# Patient Record
Sex: MALE | Race: BLACK OR AFRICAN AMERICAN | NOT HISPANIC OR LATINO | Employment: OTHER | URBAN - METROPOLITAN AREA
[De-identification: names, ages, dates, MRNs, and addresses within clinical notes are randomized per-mention and may not be internally consistent; named-entity substitution may affect disease eponyms.]

---

## 2023-05-13 ENCOUNTER — HOSPITAL ENCOUNTER (EMERGENCY)
Facility: HOSPITAL | Age: 68
Discharge: HOME | End: 2023-05-13
Attending: STUDENT IN AN ORGANIZED HEALTH CARE EDUCATION/TRAINING PROGRAM
Payer: MEDICARE

## 2023-05-13 VITALS
HEART RATE: 64 BPM | RESPIRATION RATE: 18 BRPM | SYSTOLIC BLOOD PRESSURE: 165 MMHG | HEIGHT: 68 IN | BODY MASS INDEX: 36.09 KG/M2 | DIASTOLIC BLOOD PRESSURE: 83 MMHG | WEIGHT: 238.13 LBS | OXYGEN SATURATION: 99 % | TEMPERATURE: 98 F

## 2023-05-13 DIAGNOSIS — D64.9 ANEMIA, UNSPECIFIED TYPE: Primary | ICD-10-CM

## 2023-05-13 DIAGNOSIS — R53.83 FATIGUE: ICD-10-CM

## 2023-05-13 DIAGNOSIS — G47.33 OSA (OBSTRUCTIVE SLEEP APNEA): ICD-10-CM

## 2023-05-13 LAB
ALBUMIN SERPL-MCNC: 3.6 G/DL (ref 3.4–4.8)
ALBUMIN/GLOB SERPL: 1.1 RATIO (ref 1.1–2)
ALP SERPL-CCNC: 76 UNIT/L (ref 40–150)
ALT SERPL-CCNC: 25 UNIT/L (ref 0–55)
AST SERPL-CCNC: 22 UNIT/L (ref 5–34)
BASOPHILS # BLD AUTO: 0.02 X10(3)/MCL
BASOPHILS NFR BLD AUTO: 0.3 %
BILIRUBIN DIRECT+TOT PNL SERPL-MCNC: 0.2 MG/DL
BUN SERPL-MCNC: 10.6 MG/DL (ref 8.4–25.7)
CALCIUM SERPL-MCNC: 9.2 MG/DL (ref 8.8–10)
CHLORIDE SERPL-SCNC: 110 MMOL/L (ref 98–107)
CO2 SERPL-SCNC: 26 MMOL/L (ref 23–31)
CREAT SERPL-MCNC: 0.99 MG/DL (ref 0.73–1.18)
EOSINOPHIL # BLD AUTO: 0.36 X10(3)/MCL (ref 0–0.9)
EOSINOPHIL NFR BLD AUTO: 5.1 %
ERYTHROCYTE [DISTWIDTH] IN BLOOD BY AUTOMATED COUNT: 12.8 % (ref 11.5–17)
FLUAV AG UPPER RESP QL IA.RAPID: NOT DETECTED
FLUBV AG UPPER RESP QL IA.RAPID: NOT DETECTED
GFR SERPLBLD CREATININE-BSD FMLA CKD-EPI: >60 MLS/MIN/1.73/M2
GLOBULIN SER-MCNC: 3.2 GM/DL (ref 2.4–3.5)
GLUCOSE SERPL-MCNC: 98 MG/DL (ref 82–115)
GROUP & RH: NORMAL
HCT VFR BLD AUTO: 36.4 % (ref 42–52)
HGB BLD-MCNC: 11.7 G/DL (ref 14–18)
IMM GRANULOCYTES # BLD AUTO: 0.02 X10(3)/MCL (ref 0–0.04)
IMM GRANULOCYTES NFR BLD AUTO: 0.3 %
INDIRECT COOMBS GEL: NORMAL
LYMPHOCYTES # BLD AUTO: 1.58 X10(3)/MCL (ref 0.6–4.6)
LYMPHOCYTES NFR BLD AUTO: 22.3 %
MAGNESIUM SERPL-MCNC: 1.9 MG/DL (ref 1.6–2.6)
MCH RBC QN AUTO: 29 PG (ref 27–31)
MCHC RBC AUTO-ENTMCNC: 32.1 G/DL (ref 33–36)
MCV RBC AUTO: 90.1 FL (ref 80–94)
MONOCYTES # BLD AUTO: 0.77 X10(3)/MCL (ref 0.1–1.3)
MONOCYTES NFR BLD AUTO: 10.8 %
NEUTROPHILS # BLD AUTO: 4.35 X10(3)/MCL (ref 2.1–9.2)
NEUTROPHILS NFR BLD AUTO: 61.2 %
NRBC BLD AUTO-RTO: 0 %
PHOSPHATE SERPL-MCNC: 2.9 MG/DL (ref 2.3–4.7)
PLATELET # BLD AUTO: 256 X10(3)/MCL (ref 130–400)
PMV BLD AUTO: 9.9 FL (ref 7.4–10.4)
POTASSIUM SERPL-SCNC: 4.1 MMOL/L (ref 3.5–5.1)
PROT SERPL-MCNC: 6.8 GM/DL (ref 5.8–7.6)
RBC # BLD AUTO: 4.04 X10(6)/MCL (ref 4.7–6.1)
RSV A 5' UTR RNA NPH QL NAA+PROBE: NOT DETECTED
SARS-COV-2 RNA RESP QL NAA+PROBE: NOT DETECTED
SODIUM SERPL-SCNC: 143 MMOL/L (ref 136–145)
SPECIMEN OUTDATE: NORMAL
TROPONIN I SERPL-MCNC: 0.01 NG/ML (ref 0–0.04)
TSH SERPL-ACNC: 1.17 UIU/ML (ref 0.35–4.94)
WBC # SPEC AUTO: 7.1 X10(3)/MCL (ref 4.5–11.5)

## 2023-05-13 PROCEDURE — 0241U COVID/RSV/FLU A&B PCR: CPT | Performed by: STUDENT IN AN ORGANIZED HEALTH CARE EDUCATION/TRAINING PROGRAM

## 2023-05-13 PROCEDURE — 84484 ASSAY OF TROPONIN QUANT: CPT | Performed by: STUDENT IN AN ORGANIZED HEALTH CARE EDUCATION/TRAINING PROGRAM

## 2023-05-13 PROCEDURE — 84100 ASSAY OF PHOSPHORUS: CPT | Performed by: STUDENT IN AN ORGANIZED HEALTH CARE EDUCATION/TRAINING PROGRAM

## 2023-05-13 PROCEDURE — 86900 BLOOD TYPING SEROLOGIC ABO: CPT | Performed by: STUDENT IN AN ORGANIZED HEALTH CARE EDUCATION/TRAINING PROGRAM

## 2023-05-13 PROCEDURE — 80053 COMPREHEN METABOLIC PANEL: CPT | Performed by: STUDENT IN AN ORGANIZED HEALTH CARE EDUCATION/TRAINING PROGRAM

## 2023-05-13 PROCEDURE — 83735 ASSAY OF MAGNESIUM: CPT | Performed by: STUDENT IN AN ORGANIZED HEALTH CARE EDUCATION/TRAINING PROGRAM

## 2023-05-13 PROCEDURE — 93005 ELECTROCARDIOGRAM TRACING: CPT

## 2023-05-13 PROCEDURE — 99284 EMERGENCY DEPT VISIT MOD MDM: CPT

## 2023-05-13 PROCEDURE — 84443 ASSAY THYROID STIM HORMONE: CPT | Performed by: STUDENT IN AN ORGANIZED HEALTH CARE EDUCATION/TRAINING PROGRAM

## 2023-05-13 PROCEDURE — 85025 COMPLETE CBC W/AUTO DIFF WBC: CPT | Performed by: STUDENT IN AN ORGANIZED HEALTH CARE EDUCATION/TRAINING PROGRAM

## 2023-05-13 RX ORDER — ROSUVASTATIN CALCIUM 20 MG/1
1 TABLET, COATED ORAL NIGHTLY
COMMUNITY

## 2023-05-13 RX ORDER — AMLODIPINE BESYLATE 10 MG/1
1 TABLET ORAL EVERY MORNING
COMMUNITY

## 2023-05-13 RX ORDER — DICLOFENAC SODIUM 75 MG/1
75 TABLET, DELAYED RELEASE ORAL
COMMUNITY

## 2023-05-13 RX ORDER — FERROUS SULFATE 325(65) MG
325 TABLET, DELAYED RELEASE (ENTERIC COATED) ORAL 2 TIMES DAILY
Qty: 60 TABLET | Refills: 0 | Status: SHIPPED | OUTPATIENT
Start: 2023-05-13 | End: 2023-06-12

## 2023-05-13 RX ORDER — CHOLECALCIFEROL (VITAMIN D3) 25 MCG
1 TABLET ORAL EVERY MORNING
COMMUNITY

## 2023-05-13 NOTE — ED PROVIDER NOTES
"Encounter Date: 5/13/2023       History     Chief Complaint   Patient presents with    Abnormal Lab     Patient to ED for eval of H/H levels after being told they were "on the low side" when he was donating plasma. States recent fatigue.     67-year-old male presents to ED for fatigue and reported anemia.  Attempted to donate plasma 3 days in a row where he was denied secondary to low hemoglobin and hematocrit.  Unsure his hemoglobin count but states hematocrit was 35, 36 and 38.  Has to be greater than 40 to donate.  Has been told previously he was anemic and told to make dietary changes.  States he did not make these and his blood count remained low.  States over the last several days he has had generalized fatigue.  No localized weakness, no trauma, no chest pain, no muscular soreness, no fevers or chills.  Persona in the room with the patient states he has a history of obstructive sleep apnea and has not been compliant with his CPAP for several years.  He states he struggled to keep it clean.  Otherwise no medication changes and no other complaints or concerns at this time.    Review of patient's allergies indicates:  No Known Allergies  History reviewed. No pertinent past medical history.  History reviewed. No pertinent surgical history.  History reviewed. No pertinent family history.  Social History     Tobacco Use    Smoking status: Former     Types: Cigarettes    Smokeless tobacco: Never   Substance Use Topics    Alcohol use: Not Currently    Drug use: Not Currently     Review of Systems   Constitutional:  Positive for fatigue. Negative for chills and fever.   HENT:  Negative for congestion, rhinorrhea and sore throat.    Eyes:  Negative for pain, discharge and itching.   Respiratory:  Negative for chest tightness and shortness of breath.    Cardiovascular:  Negative for chest pain and palpitations.   Gastrointestinal:  Negative for abdominal pain, nausea and vomiting.   Genitourinary:  Negative for " dysuria and hematuria.   Musculoskeletal:  Negative for myalgias and neck pain.   Skin:  Negative for color change and rash.   Neurological:  Negative for dizziness, weakness and headaches.   Psychiatric/Behavioral:  Negative for confusion. The patient is not hyperactive.      Physical Exam     Initial Vitals [05/13/23 1236]   BP Pulse Resp Temp SpO2   138/74 65 18 97.9 °F (36.6 °C) 98 %      MAP       --         Physical Exam    Constitutional: He appears well-developed and well-nourished. He is not diaphoretic. No distress.   HENT:   Head: Normocephalic and atraumatic.   Eyes: Conjunctivae and EOM are normal. Pupils are equal, round, and reactive to light.   Neck: Neck supple. No tracheal deviation present.   Normal range of motion.  Cardiovascular:  Normal rate, regular rhythm and normal heart sounds.           Pulmonary/Chest: Breath sounds normal. No respiratory distress.   Abdominal: Abdomen is soft. There is no abdominal tenderness. There is no rebound.   Musculoskeletal:         General: No tenderness. Normal range of motion.      Cervical back: Normal range of motion and neck supple.     Neurological: He is alert and oriented to person, place, and time. He has normal strength. GCS score is 15. GCS eye subscore is 4. GCS verbal subscore is 5. GCS motor subscore is 6.   Skin: Skin is warm and dry. Capillary refill takes less than 2 seconds. No rash noted.   Psychiatric: He has a normal mood and affect. His behavior is normal. Judgment and thought content normal.       ED Course   Procedures  Labs Reviewed   CBC WITH DIFFERENTIAL - Abnormal; Notable for the following components:       Result Value    RBC 4.04 (*)     Hgb 11.7 (*)     Hct 36.4 (*)     MCHC 32.1 (*)     All other components within normal limits   COMPREHENSIVE METABOLIC PANEL - Abnormal; Notable for the following components:    Chloride 110 (*)     All other components within normal limits   TSH - Normal   MAGNESIUM - Normal   PHOSPHORUS -  Normal   COVID/RSV/FLU A&B PCR - Normal    Narrative:     The Xpert Xpress SARS-CoV-2/FLU/RSV plus is a rapid, multiplexed real-time PCR test intended for the simultaneous qualitative detection and differentiation of SARS-CoV-2, Influenza A, Influenza B, and respiratory syncytial virus (RSV) viral RNA in either nasopharyngeal swab or nasal swab specimens.         TROPONIN I - Normal   CBC W/ AUTO DIFFERENTIAL    Narrative:     The following orders were created for panel order CBC auto differential.  Procedure                               Abnormality         Status                     ---------                               -----------         ------                     CBC with Differential[[id number]]        Abnormal            Final result                 Please view results for these tests on the individual orders.   EXTRA TUBES    Narrative:     The following orders were created for panel order EXTRA TUBES.  Procedure                               Abnormality         Status                     ---------                               -----------         ------                     Light Blue Top Hold[[id number]]                              In process                 Gold Top Hold[[id number]]                                    In process                   Please view results for these tests on the individual orders.   LIGHT BLUE TOP HOLD   GOLD TOP HOLD   TYPE & SCREEN     EKG Readings: (Independently Interpreted)   Initial Reading: No STEMI. Rhythm: Normal Sinus Rhythm. Ectopy: No Ectopy. Conduction: Normal. Axis: Normal. Clinical Impression: Normal Sinus Rhythm   ECG Results              EKG 12-lead (Final result)  Result time 05/13/23 22:27:42      Final result by Interface, Lab In [facility] (05/13/23 22:27:42)                   Narrative:    Test Reason : R53.83,    Vent. Rate : 066 BPM     Atrial Rate : 066 BPM     P-R Int : 156 ms          QRS Dur : 102 ms      QT Int : 398 ms       P-R-T Axes : 040 032 001  degrees     QTc Int : 417 ms    Sinus rhythm with Premature atrial complexes in a pattern of bigeminy  Nonspecific T wave abnormality  Abnormal ECG  No previous ECGs available  Confirmed by Slick Guzman MD ([id number]) on 5/13/2023 10:27:23 PM    Referred By: EMMY   SELF           Confirmed By:Slick Guzman MD                                  Imaging Results    None          Medications - No data to display  Medical Decision Making:   History:   Old Medical Records: I decided to obtain old medical records.  Initial Assessment:   67-year-old male presents to ED for generalized fatigue and low hemoglobin hematocrit at plasma donation center  Differential Diagnosis:   Anemia  Laboratory error  Electrolyte derangement  Hypothyroidism  Deconditioning  Obstructive sleep apnea  Myocardial ischemia  Viral syndrome  Malnutrition  Clinical Tests:   Lab Tests: Reviewed and Ordered  Medical Tests: Reviewed and Ordered  ED Management:  Patient in no acute distress and exam is grossly unremarkable.  No significant clinical signs of anemia.  Vitals stable, not hypotensive or bradycardic.  EKG nonischemic.  Laboratory analysis demonstrated very mild anemia consistent with his reported hematocrit just below 40.  Not reporting any blood loss or large changes in diet.  Cardiac enzymes, viral panel, and electrolytes grossly unremarkable without significant derangements.  Patient and family member describing multiple signs and symptoms of obstructive sleep apnea.  Is fatigued most of the day, snoring and even previously being prescribed CPAP which he has been noncompliant with.  Did refer for sleep study.  Prescribed iron supplementation and will follow up closely through the outpatient setting.  Strict return precautions provided and ultimately stable for discharge.  Released. (Zmora)                        Clinical Impression:   Final diagnoses:  [R53.83] Fatigue  [D64.9] Anemia, unspecified type (Primary)  [G47.33] LATOSHA (obstructive sleep  apnea)        ED Disposition Condition    Discharge Stable          ED Prescriptions       Medication Sig Dispense Start Date End Date Auth. Provider    ferrous sulfate 325 (65 FE) MG EC tablet Take 1 tablet (325 mg total) by mouth 2 (two) times daily. 60 tablet 5/13/2023 6/12/2023 Parag Gonzales MD          Follow-up Information       Follow up With Specialties Details Why Contact Info    [facility] Emergency Medicine  As needed, If symptoms worsen [address] [facility] [address] [phone]    Sleep study  Schedule an appointment as soon as possible for a visit in 1 week               Parag Gonzales MD  05/14/23 5318

## 2023-05-16 ENCOUNTER — PATIENT MESSAGE (OUTPATIENT)
Dept: RESEARCH | Facility: HOSPITAL | Age: 68
End: 2023-05-16
Payer: MEDICARE

## 2023-05-26 ENCOUNTER — HOSPITAL ENCOUNTER (EMERGENCY)
Facility: HOSPITAL | Age: 68
Discharge: HOME | End: 2023-05-26
Attending: STUDENT IN AN ORGANIZED HEALTH CARE EDUCATION/TRAINING PROGRAM
Payer: MEDICARE

## 2023-05-26 VITALS
TEMPERATURE: 98 F | SYSTOLIC BLOOD PRESSURE: 130 MMHG | OXYGEN SATURATION: 99 % | HEIGHT: 68 IN | WEIGHT: 235.88 LBS | HEART RATE: 68 BPM | RESPIRATION RATE: 18 BRPM | DIASTOLIC BLOOD PRESSURE: 84 MMHG | BODY MASS INDEX: 35.75 KG/M2

## 2023-05-26 DIAGNOSIS — R53.83 FATIGUE, UNSPECIFIED TYPE: Primary | ICD-10-CM

## 2023-05-26 DIAGNOSIS — R60.9 DEPENDENT EDEMA: ICD-10-CM

## 2023-05-26 LAB
ALBUMIN SERPL-MCNC: 3.4 G/DL (ref 3.4–4.8)
ALBUMIN/GLOB SERPL: 1.1 RATIO (ref 1.1–2)
ALP SERPL-CCNC: 68 UNIT/L (ref 40–150)
ALT SERPL-CCNC: 17 UNIT/L (ref 0–55)
APPEARANCE UR: CLEAR
AST SERPL-CCNC: 17 UNIT/L (ref 5–34)
BACTERIA #/AREA URNS AUTO: ABNORMAL /HPF
BASOPHILS # BLD AUTO: 0.01 X10(3)/MCL
BASOPHILS NFR BLD AUTO: 0.2 %
BILIRUB UR QL STRIP.AUTO: NEGATIVE MG/DL
BILIRUBIN DIRECT+TOT PNL SERPL-MCNC: 0.2 MG/DL
BNP BLD-MCNC: <10 PG/ML
BUN SERPL-MCNC: 12.9 MG/DL (ref 8.4–25.7)
CALCIUM SERPL-MCNC: 8.9 MG/DL (ref 8.8–10)
CHLORIDE SERPL-SCNC: 109 MMOL/L (ref 98–107)
CO2 SERPL-SCNC: 26 MMOL/L (ref 23–31)
COLOR UR: YELLOW
CREAT SERPL-MCNC: 1.1 MG/DL (ref 0.73–1.18)
EOSINOPHIL # BLD AUTO: 0.41 X10(3)/MCL (ref 0–0.9)
EOSINOPHIL NFR BLD AUTO: 6.3 %
ERYTHROCYTE [DISTWIDTH] IN BLOOD BY AUTOMATED COUNT: 13.3 % (ref 11.5–17)
GFR SERPLBLD CREATININE-BSD FMLA CKD-EPI: >60 MLS/MIN/1.73/M2
GLOBULIN SER-MCNC: 3 GM/DL (ref 2.4–3.5)
GLUCOSE SERPL-MCNC: 123 MG/DL (ref 82–115)
GLUCOSE UR QL STRIP.AUTO: ABNORMAL MG/DL
GROUP & RH: NORMAL
HCT VFR BLD AUTO: 35.7 % (ref 42–52)
HGB BLD-MCNC: 11.6 G/DL (ref 14–18)
HYALINE CASTS #/AREA URNS LPF: ABNORMAL /LPF
IMM GRANULOCYTES # BLD AUTO: 0.01 X10(3)/MCL (ref 0–0.04)
IMM GRANULOCYTES NFR BLD AUTO: 0.2 %
INDIRECT COOMBS GEL: NORMAL
KETONES UR QL STRIP.AUTO: ABNORMAL MG/DL
LACTATE SERPL-SCNC: 1.5 MMOL/L (ref 0.5–2.2)
LEUKOCYTE ESTERASE UR QL STRIP.AUTO: NEGATIVE UNIT/L
LIPASE SERPL-CCNC: 52 U/L
LYMPHOCYTES # BLD AUTO: 1.67 X10(3)/MCL (ref 0.6–4.6)
LYMPHOCYTES NFR BLD AUTO: 25.5 %
MCH RBC QN AUTO: 29.4 PG (ref 27–31)
MCHC RBC AUTO-ENTMCNC: 32.5 G/DL (ref 33–36)
MCV RBC AUTO: 90.6 FL (ref 80–94)
MONOCYTES # BLD AUTO: 0.78 X10(3)/MCL (ref 0.1–1.3)
MONOCYTES NFR BLD AUTO: 11.9 %
MUCOUS THREADS URNS QL MICRO: ABNORMAL /LPF
NEUTROPHILS # BLD AUTO: 3.67 X10(3)/MCL (ref 2.1–9.2)
NEUTROPHILS NFR BLD AUTO: 55.9 %
NITRITE UR QL STRIP.AUTO: NEGATIVE
NRBC BLD AUTO-RTO: 0 %
PH UR STRIP.AUTO: 5.5 [PH]
PLATELET # BLD AUTO: 275 X10(3)/MCL (ref 130–400)
PMV BLD AUTO: 10.2 FL (ref 7.4–10.4)
POTASSIUM SERPL-SCNC: 3.5 MMOL/L (ref 3.5–5.1)
PROT SERPL-MCNC: 6.4 GM/DL (ref 5.8–7.6)
PROT UR QL STRIP.AUTO: ABNORMAL MG/DL
RBC # BLD AUTO: 3.94 X10(6)/MCL (ref 4.7–6.1)
RBC #/AREA URNS AUTO: ABNORMAL /HPF
RBC UR QL AUTO: NEGATIVE UNIT/L
SODIUM SERPL-SCNC: 144 MMOL/L (ref 136–145)
SP GR UR STRIP.AUTO: 1.03
SPECIMEN OUTDATE: NORMAL
SQUAMOUS #/AREA URNS LPF: ABNORMAL /HPF
TROPONIN I SERPL-MCNC: <0.01 NG/ML (ref 0–0.04)
UROBILINOGEN UR STRIP-ACNC: NORMAL MG/DL
WBC # SPEC AUTO: 6.55 X10(3)/MCL (ref 4.5–11.5)
WBC #/AREA URNS AUTO: ABNORMAL /HPF

## 2023-05-26 PROCEDURE — 84484 ASSAY OF TROPONIN QUANT: CPT | Performed by: STUDENT IN AN ORGANIZED HEALTH CARE EDUCATION/TRAINING PROGRAM

## 2023-05-26 PROCEDURE — 85610 PROTHROMBIN TIME: CPT | Performed by: STUDENT IN AN ORGANIZED HEALTH CARE EDUCATION/TRAINING PROGRAM

## 2023-05-26 PROCEDURE — 80053 COMPREHEN METABOLIC PANEL: CPT | Performed by: STUDENT IN AN ORGANIZED HEALTH CARE EDUCATION/TRAINING PROGRAM

## 2023-05-26 PROCEDURE — 99284 EMERGENCY DEPT VISIT MOD MDM: CPT

## 2023-05-26 PROCEDURE — 93005 ELECTROCARDIOGRAM TRACING: CPT

## 2023-05-26 PROCEDURE — 85025 COMPLETE CBC W/AUTO DIFF WBC: CPT | Performed by: STUDENT IN AN ORGANIZED HEALTH CARE EDUCATION/TRAINING PROGRAM

## 2023-05-26 PROCEDURE — 25000003 PHARM REV CODE 250: Performed by: STUDENT IN AN ORGANIZED HEALTH CARE EDUCATION/TRAINING PROGRAM

## 2023-05-26 PROCEDURE — 83690 ASSAY OF LIPASE: CPT | Performed by: STUDENT IN AN ORGANIZED HEALTH CARE EDUCATION/TRAINING PROGRAM

## 2023-05-26 PROCEDURE — 83880 ASSAY OF NATRIURETIC PEPTIDE: CPT | Performed by: STUDENT IN AN ORGANIZED HEALTH CARE EDUCATION/TRAINING PROGRAM

## 2023-05-26 PROCEDURE — 83605 ASSAY OF LACTIC ACID: CPT | Performed by: STUDENT IN AN ORGANIZED HEALTH CARE EDUCATION/TRAINING PROGRAM

## 2023-05-26 PROCEDURE — 86900 BLOOD TYPING SEROLOGIC ABO: CPT | Performed by: STUDENT IN AN ORGANIZED HEALTH CARE EDUCATION/TRAINING PROGRAM

## 2023-05-26 PROCEDURE — 81001 URINALYSIS AUTO W/SCOPE: CPT | Performed by: STUDENT IN AN ORGANIZED HEALTH CARE EDUCATION/TRAINING PROGRAM

## 2023-05-26 RX ORDER — LIDOCAINE HYDROCHLORIDE 20 MG/ML
15 SOLUTION OROPHARYNGEAL ONCE
Status: COMPLETED | OUTPATIENT
Start: 2023-05-26 | End: 2023-05-26

## 2023-05-26 RX ORDER — MAG HYDROX/ALUMINUM HYD/SIMETH 200-200-20
30 SUSPENSION, ORAL (FINAL DOSE FORM) ORAL ONCE
Status: COMPLETED | OUTPATIENT
Start: 2023-05-26 | End: 2023-05-26

## 2023-05-26 RX ADMIN — LIDOCAINE HYDROCHLORIDE 15 ML: 20 SOLUTION ORAL; TOPICAL at 04:05

## 2023-05-26 RX ADMIN — ALUMINUM HYDROXIDE, MAGNESIUM HYDROXIDE, AND SIMETHICONE 30 ML: 200; 200; 20 SUSPENSION ORAL at 04:05

## 2023-05-26 NOTE — ED PROVIDER NOTES
Encounter Date: 5/26/2023       History     Chief Complaint   Patient presents with    Fatigue     PT W CO RECURRENT FATIGUE AND WEAKNESS.   HX OF ANEMIA AND STATES IRON PILLS NOT HELPING.  STATES  WAS NOT ABLE TO DONATE BLD.  REPORTS BLACK STOOLS.  CO ABD PAIN W NV.  EKG OBTAINED.     Melena     67-year-old male presents to ED for fatigue, black stool and leg swelling.  I saw this patient approximately 10 days ago.  At that time reported chronic fatigue and reportedly no hematocrit at outpatient lab draw.  He was prescribed iron supplements which she began taking.  He states afterwards his stool darkened.  Denies any gross bleeding, no abdominal pains or nausea vomiting.  He is reported intermittent history of reflux and frequently takes Tums.  Has never been on chronic gastric medications over-the-counter or prescribed otherwise.  States his fatigue is not worsened, has no associated chest pain pressure or shortness of breath.  No fevers or chills.  Reports chronic lower extremity swelling of bilateral legs and it being difficult for him to sleep lying flat.  No known history of congestive heart failure.  Has friends or family bedside.  Has no other complaints or concerns at this time.  Reports being grossly asymptomatic in department.      Review of patient's allergies indicates:  No Known Allergies  Past Medical History:   Diagnosis Date    GERD (gastroesophageal reflux disease)      History reviewed. No pertinent surgical history.  History reviewed. No pertinent family history.  Social History     Tobacco Use    Smoking status: Former     Types: Cigarettes    Smokeless tobacco: Never   Substance Use Topics    Alcohol use: Not Currently    Drug use: Not Currently     Review of Systems   Constitutional:  Positive for fatigue. Negative for chills and fever.   HENT:  Negative for congestion, rhinorrhea and sore throat.    Eyes:  Negative for pain, discharge and itching.   Respiratory:  Negative for chest tightness  and shortness of breath.    Cardiovascular:  Positive for leg swelling. Negative for chest pain and palpitations.   Gastrointestinal:  Negative for abdominal distention, abdominal pain, anal bleeding, blood in stool, constipation, diarrhea, nausea, rectal pain and vomiting.        Darkened stool   Genitourinary:  Negative for dysuria and hematuria.   Musculoskeletal:  Negative for myalgias and neck pain.   Skin:  Negative for color change and rash.   Neurological:  Negative for dizziness, weakness and headaches.   Psychiatric/Behavioral:  Negative for confusion. The patient is not hyperactive.      Physical Exam     Initial Vitals [05/26/23 1516]   BP Pulse Resp Temp SpO2   (!) 140/75 69 18 98.4 °F (36.9 °C) 99 %      MAP       --         Physical Exam    Constitutional: He appears well-developed and well-nourished. He is not diaphoretic. No distress.   HENT:   Head: Normocephalic and atraumatic.   Eyes: Conjunctivae and EOM are normal. Pupils are equal, round, and reactive to light.   Neck: Neck supple. No tracheal deviation present.   Normal range of motion.  Cardiovascular:  Normal rate, regular rhythm and normal heart sounds.           Has +1-2 2 lower extremity pitting edema up to knees.  Extremities well perfused.   Pulmonary/Chest: Breath sounds normal. No respiratory distress.   Pulmonary auscultation clear at lung bases.  No crackles wheeze or abnormal sounds appreciated.   Abdominal: Abdomen is soft. There is no abdominal tenderness. There is no rebound.   Musculoskeletal:         General: Edema present. No tenderness. Normal range of motion.      Cervical back: Normal range of motion and neck supple.     Neurological: He is alert and oriented to person, place, and time. He has normal strength. GCS score is 15. GCS eye subscore is 4. GCS verbal subscore is 5. GCS motor subscore is 6.   Skin: Skin is warm and dry. Capillary refill takes less than 2 seconds. No rash noted.   Psychiatric: He has a normal mood  and affect. His behavior is normal. Judgment and thought content normal.       ED Course   Procedures  Labs Reviewed   COMPREHENSIVE METABOLIC PANEL - Abnormal; Notable for the following components:       Result Value    Chloride 109 (*)     Glucose Level 123 (*)     All other components within normal limits   URINALYSIS, REFLEX TO URINE CULTURE - Abnormal; Notable for the following components:    Protein, UA Trace (*)     Glucose, UA 2+ (*)     Ketones, UA Trace (*)     Squamous Epithelial Cells, UA Trace (*)     Mucous, UA Trace (*)     All other components within normal limits   CBC WITH DIFFERENTIAL - Abnormal; Notable for the following components:    RBC 3.94 (*)     Hgb 11.6 (*)     Hct 35.7 (*)     MCHC 32.5 (*)     All other components within normal limits   TROPONIN I - Normal   LIPASE - Normal   LACTIC ACID, PLASMA - Normal   B-TYPE NATRIURETIC PEPTIDE - Normal   CBC W/ AUTO DIFFERENTIAL    Narrative:     The following orders were created for panel order CBC auto differential.  Procedure                               Abnormality         Status                     ---------                               -----------         ------                     CBC with Differential[[id number]]        Abnormal            Final result                 Please view results for these tests on the individual orders.   PROTIME-INR   EXTRA TUBES    Narrative:     The following orders were created for panel order EXTRA TUBES.  Procedure                               Abnormality         Status                     ---------                               -----------         ------                     Red Top Hold[[id number]]                                     In process                   Please view results for these tests on the individual orders.   RED TOP HOLD   TYPE & SCREEN     EKG Readings: (Independently Interpreted)   Initial Reading: No STEMI. Rhythm: Normal Sinus Rhythm. Ectopy: No Ectopy. Conduction: Normal. Axis:  Normal. Clinical Impression: Normal Sinus Rhythm Other Impression: Generalized T-wave flattening   ECG Results              EKG 12-lead (Weakness) Age > 50 (In process)  Result time 05/26/23 15:42:08      In process by Interface, Lab In [facility] (05/26/23 15:42:08)                   Narrative:    Test Reason : R53.1,    Vent. Rate : 067 BPM     Atrial Rate : 067 BPM     P-R Int : 164 ms          QRS Dur : 104 ms      QT Int : 396 ms       P-R-T Axes : 061 025 -21 degrees     QTc Int : 418 ms    Normal sinus rhythm  Nonspecific T wave abnormality  Abnormal ECG  When compared with ECG of 13-MAY-2023 13:45,  Premature atrial complexes are no longer Present    Referred By: [email or web address]   SELF           Confirmed By:                                   Imaging Results    None          Medications   aluminum-magnesium hydroxide-simethicone 200-200-20 mg/5 mL suspension 30 mL (30 mLs Oral Given 5/26/23 1639)     And   LIDOcaine HCl 2% oral solution 15 mL (15 mLs Oral Given 5/26/23 1640)     Medical Decision Making:   Other:   I have discussed this case with another health care provider.       <> Summary of the Discussion: Waiting for BNP for final dispo                        Clinical Impression:   Final diagnoses:  [R53.83] Fatigue, unspecified type (Primary)  [R60.9] Dependent edema        ED Disposition Condition    Discharge Stable          ED Prescriptions    None       Follow-up Information       Follow up With Specialties Details Why Contact Info    [facility] Emergency Medicine  If symptoms worsen [facility] [facility] [address] [phone]    [facility]  Schedule an appointment as soon as possible for a visit in 2 months  [facility] [facility] [address]             Parag Gonzales MD  05/26/23 0000       Seven Arias MD  05/26/23 1704

## 2023-05-29 DIAGNOSIS — G47.33 OBSTRUCTIVE SLEEP APNEA OF ADULT: Primary | ICD-10-CM

## 2023-06-07 DIAGNOSIS — G47.33 OSA (OBSTRUCTIVE SLEEP APNEA): Primary | ICD-10-CM

## 2023-06-08 ENCOUNTER — PROCEDURE VISIT (OUTPATIENT)
Dept: SLEEP MEDICINE | Facility: HOSPITAL | Age: 68
End: 2023-06-08
Attending: INTERNAL MEDICINE
Payer: MEDICARE

## 2023-06-08 DIAGNOSIS — G47.33 OSA (OBSTRUCTIVE SLEEP APNEA): ICD-10-CM

## 2023-06-08 PROCEDURE — 95810 POLYSOM 6/> YRS 4/> PARAM: CPT

## 2023-06-19 DIAGNOSIS — G47.33 OSA ON CPAP: Primary | ICD-10-CM

## 2023-06-22 ENCOUNTER — PROCEDURE VISIT (OUTPATIENT)
Dept: SLEEP MEDICINE | Facility: HOSPITAL | Age: 68
End: 2023-06-22
Attending: INTERNAL MEDICINE
Payer: MEDICARE

## 2023-06-22 DIAGNOSIS — G47.33 OSA ON CPAP: ICD-10-CM

## 2023-06-22 PROCEDURE — 95811 POLYSOM 6/>YRS CPAP 4/> PARM: CPT

## 2023-07-05 ENCOUNTER — OFFICE VISIT (OUTPATIENT)
Dept: INTERNAL MEDICINE | Facility: CLINIC | Age: 68
End: 2023-07-05
Payer: MEDICARE

## 2023-07-05 VITALS
WEIGHT: 236 LBS | RESPIRATION RATE: 18 BRPM | TEMPERATURE: 98 F | HEART RATE: 64 BPM | OXYGEN SATURATION: 98 % | BODY MASS INDEX: 35.77 KG/M2 | HEIGHT: 68 IN | SYSTOLIC BLOOD PRESSURE: 138 MMHG | DIASTOLIC BLOOD PRESSURE: 74 MMHG

## 2023-07-05 DIAGNOSIS — G89.29 CHRONIC LOW BACK PAIN WITHOUT SCIATICA, UNSPECIFIED BACK PAIN LATERALITY: ICD-10-CM

## 2023-07-05 DIAGNOSIS — M54.50 CHRONIC LOW BACK PAIN WITHOUT SCIATICA, UNSPECIFIED BACK PAIN LATERALITY: ICD-10-CM

## 2023-07-05 DIAGNOSIS — F17.211 CIGARETTE NICOTINE DEPENDENCE IN REMISSION: ICD-10-CM

## 2023-07-05 DIAGNOSIS — E55.9 VITAMIN D DEFICIENCY: ICD-10-CM

## 2023-07-05 DIAGNOSIS — E66.01 SEVERE OBESITY (BMI 35.0-39.9) WITH COMORBIDITY: Primary | ICD-10-CM

## 2023-07-05 DIAGNOSIS — I10 PRIMARY HYPERTENSION: ICD-10-CM

## 2023-07-05 DIAGNOSIS — E11.9 TYPE 2 DIABETES MELLITUS WITHOUT COMPLICATION, WITHOUT LONG-TERM CURRENT USE OF INSULIN: ICD-10-CM

## 2023-07-05 DIAGNOSIS — G47.33 OSA (OBSTRUCTIVE SLEEP APNEA): ICD-10-CM

## 2023-07-05 DIAGNOSIS — I10 HYPERTENSION, UNSPECIFIED TYPE: ICD-10-CM

## 2023-07-05 DIAGNOSIS — E78.5 HYPERLIPIDEMIA, UNSPECIFIED HYPERLIPIDEMIA TYPE: ICD-10-CM

## 2023-07-05 PROCEDURE — 99214 OFFICE O/P EST MOD 30 MIN: CPT | Mod: PBBFAC | Performed by: STUDENT IN AN ORGANIZED HEALTH CARE EDUCATION/TRAINING PROGRAM

## 2023-07-05 RX ORDER — METHOCARBAMOL 750 MG/1
TABLET, FILM COATED ORAL
COMMUNITY
Start: 2023-04-24

## 2023-07-05 RX ORDER — TRAZODONE HYDROCHLORIDE 150 MG/1
300 TABLET ORAL
COMMUNITY
Start: 2023-03-10

## 2023-07-05 RX ORDER — SODIUM, POTASSIUM,MAG SULFATES 17.5-3.13G
SOLUTION, RECONSTITUTED, ORAL ORAL
COMMUNITY
Start: 2023-05-22 | End: 2023-11-22

## 2023-07-05 RX ORDER — FERROUS SULFATE TAB 325 MG (65 MG ELEMENTAL FE) 325 (65 FE) MG
TAB ORAL 2 TIMES DAILY
COMMUNITY
Start: 2023-06-12 | End: 2023-07-18 | Stop reason: SDUPTHER

## 2023-07-05 RX ORDER — FERROUS SULFATE 325(65) MG
325 TABLET, DELAYED RELEASE (ENTERIC COATED) ORAL
COMMUNITY
Start: 2023-06-12 | End: 2023-07-05 | Stop reason: SDUPTHER

## 2023-07-05 NOTE — PROGRESS NOTES
Patient ID: Hayden Aquino is a 67 y.o. male.    Chief Complaint: No chief complaint on file.    Patient is a 67-year-old male with past medical history of hypertension, hyperlipidemia, obesity, chronic back pain, LATOSHA who presents to Internal Medicine Clinic for initial visit with myself.  [facility] but has been inconsistently able to get an appointment.  He states he has been doing well overall, has had sleep apnea for some time and recent had new sleep study done in June.  He states they are trying to get him set up with home BiPAP.  Blood pressure slightly elevated in clinic, patient states it has been pretty well controlled at home.  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  Also had recent drop in hemoglobin, last colonoscopy was around 3 years ago and he states he usually has between 5-7 polyps each time.  Continues to take iron supplementation.  Patient states sleep is very poor, never sleeps more than 2 hours at a time.  Overall patient doing okay, no other acute concerns at this time.    Review of Systems   Constitutional:  Positive for activity change. Negative for unexpected weight change.   HENT:  Negative for hearing loss and rhinorrhea.    Eyes:  Negative for discharge and visual disturbance.   Respiratory:  Negative for chest tightness.    Cardiovascular:  Negative for chest pain and palpitations.   Gastrointestinal:  Negative for blood in stool, constipation and diarrhea.   Endocrine: Positive for polyuria. Negative for polydipsia.   Genitourinary:  Positive for urgency. Negative for difficulty urinating and hematuria.   Musculoskeletal:  Positive for joint swelling. Negative for arthralgias and neck pain.   Neurological:  Positive for weakness and headaches.        Objective     Physical Exam  Constitutional:       Appearance:  Normal appearance. He is obese.   HENT:      Head: Normocephalic and atraumatic.   Cardiovascular:      Rate and Rhythm: Normal rate and regular rhythm.      Heart sounds: Normal heart sounds.   Pulmonary:      Breath sounds: Normal breath sounds.      Comments: Slightly decreased  Abdominal:      Palpations: Abdomen is soft.   Musculoskeletal:      Comments: Wearing back brace   Neurological:      General: No focal deficit present.      Mental Status: He is alert and oriented to person, place, and time.          Assessment and Plan     1. Severe obesity (BMI 35.0-39.9) with comorbidity    2. Hypertension, unspecified type    3. Hyperlipidemia, unspecified hyperlipidemia type  -     Lipid Panel; Future; Expected date: 07/05/2023    4. Vitamin D deficiency  -     Vitamin D; Future; Expected date: 07/05/2023    5. Type 2 diabetes mellitus without complication, without long-term current use of insulin  -     Hemoglobin A1C; Future; Expected date: 07/05/2023    6. Primary hypertension    7. Chronic low back pain without sciatica, unspecified back pain laterality    8. LATOSHA (obstructive sleep apnea)    Other orders  -     Pneumococcal Conjugate Vaccine (20 Valent) (IM)      Hypertension  -should continue taking Norvasc 10 mg daily   -blood pressure not quite at goal today, should keep log of BPs at home  -may need additional agent started at next visit    Hyperlipidemia  -currently taking Crestor 10 mg daily  -will recheck lipid panel today as there is not one in system    LATOSHA  Possible [facility]  -patient had recent sleep study done, states they are trying to get him set up with BiPAP at home  -do not see these records, told patient to reach out if he needs help or more documentation to get him BiPAP    Vitamin-D deficiency  -patient states he has been taking a 1000 units daily for some time now, will recheck vitamin-D level today to see if he needs continued supplementation    Normocytic anemia   -recent hemoglobin has  remained around 12, previously closer to 14   -has had colonoscopies every 3 years, has another scheduled next week   -will follow-up results of this, can continue taking ferrous sulfate every other day for now    Chronic back pain   -continue taking Voltaren 75 mg as needed for when pain is more severe   -can switch off between Tylenol and ibuprofen as needed for more mild pain    Patient recently had Pneumovax last year   Will repeat labs today including A1c, vitamin-D level, lipid panel  Patient scheduled for colonoscopy next week  Will place order for low-dose screening CT as patient was smoker for 40 years, recently quit last year      [facility]       Follow up in about 4 months (around 11/5/2023).

## 2023-07-06 NOTE — PROGRESS NOTES
Attending Addendum:   Patient seen and examined in clinic. Management and Plan were discussed with resident. Care was reasonable and necessary.   Mayda Casper MD  [facility]

## 2023-07-11 ENCOUNTER — PATIENT MESSAGE (OUTPATIENT)
Dept: NEUROLOGY | Facility: CLINIC | Age: 68
End: 2023-07-11
Payer: MEDICARE

## 2023-07-18 ENCOUNTER — HOSPITAL ENCOUNTER (EMERGENCY)
Facility: HOSPITAL | Age: 68
Discharge: HOME | End: 2023-07-18
Attending: EMERGENCY MEDICINE
Payer: MEDICARE

## 2023-07-18 VITALS
TEMPERATURE: 98 F | RESPIRATION RATE: 18 BRPM | DIASTOLIC BLOOD PRESSURE: 76 MMHG | WEIGHT: 231.5 LBS | HEIGHT: 68 IN | HEART RATE: 16 BPM | BODY MASS INDEX: 35.09 KG/M2 | OXYGEN SATURATION: 95 % | SYSTOLIC BLOOD PRESSURE: 153 MMHG

## 2023-07-18 DIAGNOSIS — Z76.0 ENCOUNTER FOR MEDICATION REFILL: Primary | ICD-10-CM

## 2023-07-18 PROCEDURE — 99282 EMERGENCY DEPT VISIT SF MDM: CPT

## 2023-07-18 RX ORDER — FERROUS SULFATE TAB 325 MG (65 MG ELEMENTAL FE) 325 (65 FE) MG
325 TAB ORAL 2 TIMES DAILY
Qty: 60 TABLET | Refills: 0 | Status: SHIPPED | OUTPATIENT
Start: 2023-07-18 | End: 2023-08-17

## 2023-07-18 NOTE — ED PROVIDER NOTES
Encounter Date: 2023       History     Chief Complaint   Patient presents with    Medication Refill     Pt requesting med refill on Fe so he can donate plasma, [facility].       Pt is a 67 y.o. male who presents to the [facility] ED requesting a refill of iron tablets. Pt reports Hx of anemia and has ran out of his iron tablets. Pt under the care of [facility] but keeps getting his appointment rescheduled. Denies free bleeding, rectal bleeding, black stool, maroon stool, chest pain, or SOB.     Review of patient's allergies indicates:  No Known Allergies  Past Medical History:   Diagnosis Date    Disorder of kidney and ureter 2022    GERD (gastroesophageal reflux disease)     Headache 2015    Hypertension 2015     Past Surgical History:   Procedure Laterality Date    SPINE SURGERY  2012    Lower back surgery     Family History   Problem Relation Age of Onset    Hypertension Mother     Cancer Father          1983     Social History     Tobacco Use    Smoking status: Former     Packs/day: 0.50     Years: 40.00     Pack years: 20.00     Types: Cigarettes     Start date: 1974     Quit date: 2022     Years since quittin.1    Smokeless tobacco: Never    Tobacco comments:     Quit twice, approx. 4 to 5 yrs. Each   Substance Use Topics    Alcohol use: Not Currently     Alcohol/week: 23.0 standard drinks     Types: 3 Glasses of wine, 10 Cans of beer, 4 Shots of liquor, 6 Drinks containing 0.5 oz of alcohol per week     Comment: Some days and weeks were none    Drug use: Never     Review of Systems   Constitutional:  Negative for chills, diaphoresis, fatigue and fever.   HENT:  Negative for facial swelling, postnasal drip, rhinorrhea, sinus pressure, sinus pain, sore throat and trouble swallowing.    Respiratory:  Negative for cough, chest tightness, shortness of breath and wheezing.    Cardiovascular:  Negative for chest pain, palpitations and leg swelling.    Gastrointestinal:  Negative for abdominal pain, diarrhea, nausea and vomiting.   Genitourinary:  Negative for dysuria, flank pain, hematuria and urgency.   Musculoskeletal:  Negative for arthralgias, back pain and myalgias.   Skin:  Negative for color change and rash.   Neurological:  Negative for dizziness, syncope, weakness and headaches.   Hematological:  Does not bruise/bleed easily.   All other systems reviewed and are negative.    Physical Exam     Initial Vitals [07/18/23 1721]   BP Pulse Resp Temp SpO2   (!) 153/76 (!) 16 18 97.9 °F (36.6 °C) 95 %      MAP       --         Physical Exam    Nursing note and vitals reviewed.  Constitutional: Vital signs are normal. He appears well-developed and well-nourished.   HENT:   Head: Normocephalic.   Nose: Nose normal.   Mouth/Throat: Oropharynx is clear and moist.   Eyes: Conjunctivae and EOM are normal. Pupils are equal, round, and reactive to light.   Neck: Neck supple.   Normal range of motion.  Cardiovascular:  Normal rate, regular rhythm, normal heart sounds and intact distal pulses.           Pulmonary/Chest: Effort normal and breath sounds normal. No respiratory distress. He has no wheezes. He has no rhonchi. He has no rales. He exhibits no tenderness.   Abdominal: Abdomen is soft and flat. Bowel sounds are normal. There is no abdominal tenderness. There is no rebound, no guarding, no tenderness at McBurney's point and negative Glover's sign.   Musculoskeletal:         General: Normal range of motion.      Cervical back: Normal range of motion and neck supple.     Neurological: He is alert and oriented to person, place, and time. He has normal strength.   Skin: Skin is warm and dry. Capillary refill takes less than 2 seconds.   Psychiatric: He has a normal mood and affect. His behavior is normal. Judgment and thought content normal.       ED Course   Procedures  Labs Reviewed - No data to display       Imaging Results    None          Medications - No data  to display  Medical Decision Making:   Differential Diagnosis:   Anemia  Medication refill  ED Management:  5:44 PM Reassessed patient at this time. Discussed with patient all pertinent ED information and results. Discussed diagnosis and treatment plan with patient. I have discussed with pt my concerns that with his reported anemia history, he probably should discuss with his PCP if donating blood and plasma is the right action. Follow up instructions and return to ED instruction have been given. All questions and concerns were addressed at this time. Patient voices understanding of information and instructions. Patient is comfortable with plan and discharge. Patient is stable for discharge.                           Clinical Impression:   Final diagnoses:  [Z76.0] Encounter for medication refill (Primary)        ED Disposition Condition    Discharge Stable          ED Prescriptions       Medication Sig Dispense Start Date End Date Auth. Provider    FEROSUL 325 mg (65 mg iron) Tab tablet Take 1 tablet (325 mg total) by mouth 2 (two) times daily. 60 tablet 7/18/2023 8/17/2023 Paolo Atkins Jr., EMILEE          Follow-up Information       Follow up With Specialties Details Why Contact Info    [facility] Emergency Medicine In 3 days As needed, If symptoms worsen [address] [facility] [id number] [phone]             Paolo Atkins Jr., EMILEE  07/18/23 7229

## 2023-07-24 ENCOUNTER — PATIENT MESSAGE (OUTPATIENT)
Dept: RESEARCH | Facility: HOSPITAL | Age: 68
End: 2023-07-24
Payer: MEDICARE

## 2023-07-31 ENCOUNTER — OFFICE VISIT (OUTPATIENT)
Dept: NEUROLOGY | Facility: CLINIC | Age: 68
End: 2023-07-31
Payer: MEDICARE

## 2023-07-31 ENCOUNTER — PATIENT MESSAGE (OUTPATIENT)
Dept: RESEARCH | Facility: HOSPITAL | Age: 68
End: 2023-07-31
Payer: MEDICARE

## 2023-07-31 VITALS
BODY MASS INDEX: 35.46 KG/M2 | DIASTOLIC BLOOD PRESSURE: 92 MMHG | WEIGHT: 234 LBS | SYSTOLIC BLOOD PRESSURE: 132 MMHG | HEIGHT: 68 IN

## 2023-07-31 DIAGNOSIS — G47.33 OSA (OBSTRUCTIVE SLEEP APNEA): ICD-10-CM

## 2023-07-31 DIAGNOSIS — R53.83 FATIGUE: ICD-10-CM

## 2023-07-31 PROCEDURE — 99204 OFFICE O/P NEW MOD 45 MIN: CPT | Mod: S$PBB,,, | Performed by: SPECIALIST

## 2023-07-31 PROCEDURE — 99204 PR OFFICE/OUTPT VISIT, NEW, LEVL IV, 45-59 MIN: ICD-10-PCS | Mod: S$PBB,,, | Performed by: SPECIALIST

## 2023-07-31 PROCEDURE — 99999 PR PBB SHADOW E&M-EST. PATIENT-LVL III: ICD-10-PCS | Mod: PBBFAC,,, | Performed by: SPECIALIST

## 2023-07-31 PROCEDURE — 99213 OFFICE O/P EST LOW 20 MIN: CPT | Mod: PBBFAC | Performed by: SPECIALIST

## 2023-07-31 PROCEDURE — 99999 PR PBB SHADOW E&M-EST. PATIENT-LVL III: CPT | Mod: PBBFAC,,, | Performed by: SPECIALIST

## 2023-07-31 RX ORDER — FAMOTIDINE 40 MG/1
TABLET, FILM COATED ORAL
COMMUNITY
Start: 2023-07-13

## 2023-07-31 RX ORDER — TADALAFIL 20 MG/1
20 TABLET ORAL DAILY
COMMUNITY

## 2023-07-31 RX ORDER — PANTOPRAZOLE SODIUM 40 MG/1
40 TABLET, DELAYED RELEASE ORAL EVERY MORNING
COMMUNITY
Start: 2023-07-13

## 2023-07-31 RX ORDER — PRAZOSIN HYDROCHLORIDE 1 MG/1
1 CAPSULE ORAL NIGHTLY
COMMUNITY
End: 2023-11-22

## 2023-07-31 NOTE — PROGRESS NOTES
"Subjective:       Patient ID: Hayden Aquino is a 67 y.o. male.    Chief Complaint: sleep apnea /establish     HPI:            New pt for baldomero (Here for baldomero//Pt c/o snoring, witnessed apnea, EDS, nightmares, crawling/aching in legs, and naps without BIPAP machine. Denies feeling unable to move when awakening or falling asleep. SS done 2023, current pap use is beneficial for sleep. DME is [facility]. )  "I've been issued a BiPAP machine (early July) and it's helping"  used pap in the past but did not work well     Review of Systems    EPWORTH SLEEPINESS SCALE 2023   Sitting and reading 2   Watching TV 3   Sitting, inactive in a public place (e.g. a theatre or a meeting) 1   As a passenger in a car for an hour without a break 0   Lying down to rest in the afternoon when circumstances permit 1   Sitting and talking to someone 0   Sitting quietly after a lunch without alcohol 1   In a car, while stopped for a few minutes in traffic 0   Total score 8         Social History     Socioeconomic History    Marital status: Single   Occupational History    Occupation: retired    Tobacco Use    Smoking status: Former     Current packs/day: 0.00     Average packs/day: 0.5 packs/day for 48.0 years (24.0 ttl pk-yrs)     Types: Cigarettes     Start date: 1974     Quit date: 2022     Years since quittin.2    Smokeless tobacco: Never    Tobacco comments:     Quit twice, approx. 4 to 5 yrs. Each   Substance and Sexual Activity    Alcohol use: Not Currently     Alcohol/week: 23.0 standard drinks of alcohol     Types: 3 Glasses of wine, 10 Cans of beer, 4 Shots of liquor, 6 Drinks containing 0.5 oz of alcohol per week     Comment: Some days and weeks were none    Drug use: Never    Sexual activity: Not Currently     Partners: Female       Current Outpatient Medications   Medication Instructions    amLODIPine (NORVASC) 10 MG tablet 1 tablet, Oral, Every morning    diclofenac (VOLTAREN) 75 mg, Oral    FeroSuL " "325 mg, Oral, 2 times daily    methocarbamoL (ROBAXIN) 750 MG Tab No dose, route, or frequency recorded.    pantoprazole (PROTONIX) 40 mg, Oral, Every morning    PEPCID 40 mg tablet     prazosin (MINIPRESS) 1 mg, Oral, Nightly    rosuvastatin (CRESTOR) 20 MG tablet 1 tablet, Oral, Nightly    tadalafiL (CIALIS) 20 mg, Oral, Daily    traZODone (DESYREL) 300 mg    vitamin D (VITAMIN D3) 1000 units Tab 1 tablet, Oral, Every morning         Objective:        Exam:   BP (!) 132/92 (BP Location: Left arm, Patient Position: Sitting)   Ht 5' 8" (1.727 m)   Wt 106.1 kg (234 lb)   BMI 35.58 kg/m²   Body mass index is 35.58 kg/m².    General Exam  if accompanied, by__ n  body habitus_  mental status_alert and appropriate  oropharynx_Mallampati grade_  neck_  Heart__ rrr  Extremities_ no pretibial edema   skin_    Neurological    Speech __ ok   cranial nerves:  CN 2 VF_ok   CN 7_no lower face asymmetry  CN 12 tongue_ok  motor__  gait_ indep   Labs:      PAP download:   He's only had the machine less than one mo but wearing on ave 6.5 h with ahi 3.1    PSG:   AHI:   64/hr  overall     /hr in REM   Marty:   80%   Time spent sats <88%:   1% of night     PAP titration night:   Ultim on BiPAP with rate 17/11; rate 10      Lengthy discussion about the risks of untreated moderate to severe obstructive sleep apnea (LATOSHA).   Testing modalities/test options for sleep apnea discussed.  Potential need for treatment of LATOSHA discussed including CPAP or Bilevel  PAP.   Alternatives to PAP discussed if PAP not ultimately tolerated.  Risks of drowsy driving discussed.  Weight loss discussed if patient is overweight.            Assessment/Plan:       Problem List Items Addressed This Visit          Other    LATOSHA (obstructive sleep apnea)     Other Visit Diagnoses       Fatigue                      Other comments/ follow up:        We'll get another download from [facility] given he's graded unfairly since he got his cpap machine during the " month and the download done not even one month after wearing pap         Follow up in about 1 year (around 7/31/2024).     Jay Cardenas MD  [facility]

## 2023-08-10 ENCOUNTER — HOSPITAL ENCOUNTER (OUTPATIENT)
Dept: RADIOLOGY | Facility: HOSPITAL | Age: 68
Discharge: HOME | End: 2023-08-10
Attending: STUDENT IN AN ORGANIZED HEALTH CARE EDUCATION/TRAINING PROGRAM
Payer: MEDICARE

## 2023-08-10 DIAGNOSIS — F17.211 CIGARETTE NICOTINE DEPENDENCE IN REMISSION: ICD-10-CM

## 2023-08-10 PROCEDURE — 71271 CT THORAX LUNG CANCER SCR C-: CPT | Mod: TC

## 2023-08-16 LAB
INR PPP: 1
PROTHROMBIN TIME: 13.1 SECONDS (ref 11.4–14)

## 2023-11-22 ENCOUNTER — OFFICE VISIT (OUTPATIENT)
Dept: INTERNAL MEDICINE | Facility: CLINIC | Age: 68
End: 2023-11-22
Payer: MEDICARE

## 2023-11-22 ENCOUNTER — LAB VISIT (OUTPATIENT)
Dept: LAB | Facility: HOSPITAL | Age: 68
End: 2023-11-22
Attending: STUDENT IN AN ORGANIZED HEALTH CARE EDUCATION/TRAINING PROGRAM
Payer: MEDICARE

## 2023-11-22 VITALS
WEIGHT: 248.81 LBS | SYSTOLIC BLOOD PRESSURE: 152 MMHG | RESPIRATION RATE: 18 BRPM | HEIGHT: 68 IN | OXYGEN SATURATION: 99 % | BODY MASS INDEX: 37.71 KG/M2 | HEART RATE: 65 BPM | DIASTOLIC BLOOD PRESSURE: 72 MMHG | TEMPERATURE: 98 F

## 2023-11-22 DIAGNOSIS — E66.01 SEVERE OBESITY (BMI 35.0-39.9) WITH COMORBIDITY: ICD-10-CM

## 2023-11-22 DIAGNOSIS — E78.5 HYPERLIPIDEMIA, UNSPECIFIED HYPERLIPIDEMIA TYPE: ICD-10-CM

## 2023-11-22 DIAGNOSIS — M54.50 CHRONIC LOW BACK PAIN WITHOUT SCIATICA, UNSPECIFIED BACK PAIN LATERALITY: ICD-10-CM

## 2023-11-22 DIAGNOSIS — G47.33 OSA (OBSTRUCTIVE SLEEP APNEA): ICD-10-CM

## 2023-11-22 DIAGNOSIS — I10 HYPERTENSION, UNSPECIFIED TYPE: ICD-10-CM

## 2023-11-22 DIAGNOSIS — I10 HYPERTENSION, UNSPECIFIED TYPE: Primary | ICD-10-CM

## 2023-11-22 DIAGNOSIS — I10 PRIMARY HYPERTENSION: ICD-10-CM

## 2023-11-22 DIAGNOSIS — G89.29 CHRONIC LOW BACK PAIN WITHOUT SCIATICA, UNSPECIFIED BACK PAIN LATERALITY: ICD-10-CM

## 2023-11-22 LAB
BASOPHILS # BLD AUTO: 0.01 X10(3)/MCL
BASOPHILS NFR BLD AUTO: 0.2 %
EOSINOPHIL # BLD AUTO: 0.23 X10(3)/MCL (ref 0–0.9)
EOSINOPHIL NFR BLD AUTO: 4 %
ERYTHROCYTE [DISTWIDTH] IN BLOOD BY AUTOMATED COUNT: 12.9 % (ref 11.5–17)
HCT VFR BLD AUTO: 38.8 % (ref 42–52)
HGB BLD-MCNC: 12.6 G/DL (ref 14–18)
IMM GRANULOCYTES # BLD AUTO: 0.01 X10(3)/MCL (ref 0–0.04)
IMM GRANULOCYTES NFR BLD AUTO: 0.2 %
LYMPHOCYTES # BLD AUTO: 1.42 X10(3)/MCL (ref 0.6–4.6)
LYMPHOCYTES NFR BLD AUTO: 24.6 %
MCH RBC QN AUTO: 28.3 PG (ref 27–31)
MCHC RBC AUTO-ENTMCNC: 32.5 G/DL (ref 33–36)
MCV RBC AUTO: 87 FL (ref 80–94)
MONOCYTES # BLD AUTO: 0.57 X10(3)/MCL (ref 0.1–1.3)
MONOCYTES NFR BLD AUTO: 9.9 %
NEUTROPHILS # BLD AUTO: 3.53 X10(3)/MCL (ref 2.1–9.2)
NEUTROPHILS NFR BLD AUTO: 61.1 %
NRBC BLD AUTO-RTO: 0 %
PLATELET # BLD AUTO: 221 X10(3)/MCL (ref 130–400)
PMV BLD AUTO: 10.8 FL (ref 7.4–10.4)
RBC # BLD AUTO: 4.46 X10(6)/MCL (ref 4.7–6.1)
WBC # SPEC AUTO: 5.77 X10(3)/MCL (ref 4.5–11.5)

## 2023-11-22 PROCEDURE — 85025 COMPLETE CBC W/AUTO DIFF WBC: CPT

## 2023-11-22 PROCEDURE — 36415 COLL VENOUS BLD VENIPUNCTURE: CPT

## 2023-11-22 PROCEDURE — 99214 OFFICE O/P EST MOD 30 MIN: CPT | Mod: PBBFAC | Performed by: STUDENT IN AN ORGANIZED HEALTH CARE EDUCATION/TRAINING PROGRAM

## 2023-11-22 RX ORDER — FERROUS GLUCONATE 324(37.5)
324 TABLET ORAL
COMMUNITY
Start: 2023-07-19 | End: 2023-11-22

## 2023-11-22 RX ORDER — LISINOPRIL AND HYDROCHLOROTHIAZIDE 10; 12.5 MG/1; MG/1
1 TABLET ORAL DAILY
Qty: 90 TABLET | Refills: 3 | Status: SHIPPED | OUTPATIENT
Start: 2023-11-22 | End: 2024-11-21

## 2023-11-22 RX ORDER — FERROUS SULFATE 325(65) MG
325 TABLET, DELAYED RELEASE (ENTERIC COATED) ORAL EVERY OTHER DAY
Qty: 30 TABLET | Refills: 3 | Status: SHIPPED | OUTPATIENT
Start: 2023-11-22

## 2023-11-22 NOTE — PROGRESS NOTES
Patient ID: Hayden Aquino is a 68 y.o. male.    Chief Complaint: [facility]    Patient is a 67-year-old male with past medical history of hypertension, hyperlipidemia, obesity, chronic back pain, LATOSHA who presents to Internal Medicine Clinic for scheduled follow-up.  Patient states he is still seen by me care doctor gave NSTEMI decision on who he wants to continue to see.  Patient states he has been feeling okay overall, has noticed some increased swelling in his lower extremity throughout the day.  He states he has chronic pain but it has been decently controlled.  He got new BiPAP machine and has been wearing it at nighttime for his sleep apnea.  Patient had recent colonoscopy and EGD with Dr. Alexander, had multiple ulcers found on EGD.  Patient is concerned as his blood counts have remained on low side, prevents him from donating plasma.  He states he is doing better wing on ferrous sulfate as medicine.  Otherwise patient doing okay, no other acute concerns at this        Review of Systems   Constitutional:  Positive for activity change. Negative for unexpected weight change.   HENT:  Negative for hearing loss, rhinorrhea and trouble swallowing.    Eyes:  Negative for discharge and visual disturbance.   Respiratory:  Negative for chest tightness and wheezing.    Cardiovascular:  Negative for chest pain and palpitations.   Gastrointestinal:  Negative for blood in stool, constipation, diarrhea and vomiting.   Endocrine: Positive for polyuria. Negative for polydipsia.   Genitourinary:  Positive for urgency. Negative for difficulty urinating and hematuria.   Musculoskeletal:  Positive for joint swelling. Negative for arthralgias and neck pain.   Neurological:  Positive for weakness and headaches.   Psychiatric/Behavioral:  Positive for confusion. Negative for dysphoric mood.           Objective     Physical Exam  Constitutional:       Appearance: Normal appearance. He is obese.   HENT:      Head:  Normocephalic and atraumatic.   Cardiovascular:      Rate and Rhythm: Normal rate and regular rhythm.      Heart sounds: Normal heart sounds.   Pulmonary:      Breath sounds: Normal breath sounds.      Comments: Slightly decreased  Abdominal:      Palpations: Abdomen is soft.   Musculoskeletal:         General: Swelling and tenderness present.      Right lower leg: Edema present.      Left lower leg: Edema present.      Comments: Wearing back brace   Neurological:      General: No focal deficit present.      Mental Status: He is alert and oriented to person, place, and time.            Assessment and Plan     1. Hypertension, unspecified type  -     CBC Auto Differential; Future; Expected date: 11/22/2023    2. Hyperlipidemia, unspecified hyperlipidemia type    3. Primary hypertension    4. Severe obesity (BMI 35.0-39.9) with comorbidity    5. Chronic low back pain without sciatica, unspecified back pain laterality    6. LATOSHA (obstructive sleep apnea)    Other orders  -     ferrous sulfate 325 (65 FE) MG EC tablet; Take 1 tablet (325 mg total) by mouth every other day.  Dispense: 30 tablet; Refill: 3  -     lisinopriL-hydrochlorothiazide (PRINZIDE,ZESTORETIC) 10-12.5 mg per tablet; Take 1 tablet by mouth once daily.  Dispense: 90 tablet; Refill: 3      Hypertension  -should continue taking Norvasc 10 mg daily   -blood pressure still not at goal, will add lisinopril-hydrochlorothiazide 10-12.5 mg daily  -should continue to check blood pressures at home, reach out if worsening    Hyperlipidemia  -currently taking Crestor 10 mg daily    LATOSHA  Possible OHS  -recently saw neurology for LATOSHA, has been set up with new BiPAP and is doing well.  States he consistently sleeps with the    Normocytic anemia   -recent hemoglobin has remained around 12, previously closer to 14   -patient had recent colonoscopy and EGD, colonoscopy mostly normal but EGD showed multiple ulcers  -should remain on Protonix and Pepcid for now per GI  recommendations  -patient states he had better results taking ferrous sulfate, okay to discontinue ferrous gluconate and switch to that for now    Chronic back pain   -continue taking Voltaren 75 mg as needed for when pain is more severe   -can switch off between Tylenol and ibuprofen as needed for more mild pain    Will repeat CBC today to see if blood count has improved after EGD performed    [facility]       Follow up in about 4 months (around 3/22/2024).

## 2023-11-28 NOTE — PROGRESS NOTES
I have reviewed and concur with the resident's history, physical, assessment, and plan.  I have discussed with him all issues related to the diagnosis, workup and treatment plan. Care provided as reasonable and necessary.HTN- well controlled    Thompson Crane MD  [facility]

## 2023-12-09 ENCOUNTER — OFFICE VISIT (OUTPATIENT)
Dept: INTERNAL MEDICINE | Facility: CLINIC | Age: 68
End: 2023-12-09
Payer: MEDICARE

## 2023-12-09 VITALS
OXYGEN SATURATION: 99 % | HEIGHT: 68 IN | HEART RATE: 68 BPM | TEMPERATURE: 98 F | RESPIRATION RATE: 16 BRPM | SYSTOLIC BLOOD PRESSURE: 126 MMHG | BODY MASS INDEX: 35.92 KG/M2 | DIASTOLIC BLOOD PRESSURE: 73 MMHG | WEIGHT: 237 LBS

## 2023-12-09 DIAGNOSIS — Z23 NEED FOR IMMUNIZATION AGAINST INFLUENZA: ICD-10-CM

## 2023-12-09 DIAGNOSIS — Z00.00 ENCOUNTER FOR PREVENTIVE HEALTH EXAMINATION: ICD-10-CM

## 2023-12-09 DIAGNOSIS — Z13.6 SCREENING FOR AAA (ABDOMINAL AORTIC ANEURYSM): Primary | ICD-10-CM

## 2023-12-09 DIAGNOSIS — Z74.09 OTHER REDUCED MOBILITY: ICD-10-CM

## 2023-12-09 DIAGNOSIS — Z72.3 LACK OF PHYSICAL ACTIVITY: ICD-10-CM

## 2023-12-09 PROCEDURE — 99215 OFFICE O/P EST HI 40 MIN: CPT | Mod: PBBFAC | Performed by: STUDENT IN AN ORGANIZED HEALTH CARE EDUCATION/TRAINING PROGRAM

## 2023-12-09 NOTE — PATIENT INSTRUCTIONS
Counseling and Referral of Other Preventative  (Italic type indicates deductible and co-insurance are waived)    Patient Name: Hayden Aquino  Today's Date: 12/9/2023    Health Maintenance       Date Due Completion Date    Hepatitis C Screening Never done ---    Abdominal Aortic Aneurysm Screening Never done ---    RSV Vaccine (Age 60+ and Pregnant patients) (1 - 1-dose 60+ series) 12/09/2023 (Originally 8/30/2016) ---    Influenza Vaccine (1) 06/30/2024 (Originally 9/1/2023) 1/1/2023    TETANUS VACCINE 12/09/2024 (Originally 8/9/2016) 8/9/2006    Shingles Vaccine (1 of 2) 12/09/2024 (Originally 8/30/2006) ---    COVID-19 Vaccine (3 - 2023-24 season) 12/09/2024 (Originally 9/1/2023) 4/24/2021    LDCT Lung Screen 08/10/2024 8/10/2023    Colorectal Cancer Screening 07/01/2025 ---    Hemoglobin A1c (Diabetic Prevention Screening) 07/05/2026 7/5/2023    Lipid Panel 07/05/2028 7/5/2023        Orders Placed This Encounter   Procedures    US AAA Screening    Hepatitis C Antibody    HIV 1/2 Ag/Ab (4th Gen)       The following information is provided to all patients.  This information is to help you find resources for any of the problems found today that may be affecting your health:                Living healthy guide: www.[facility].[place].[facility]      Understanding Diabetes: www.diabetes.org      Eating healthy: www.cdc.gov/healthyweight      CDC home safety checklist: www.cdc.gov/steadi/patient.html      Agency on Aging: www.goea.[place].[facility]      Alcoholics anonymous (AA): www.aa.org      Physical Activity: www.alexx.nih.gov/rm5drkm      Tobacco use: www.quitwithusla.org

## 2023-12-09 NOTE — PROGRESS NOTES
"  Hayden Aquino presented for an initial Medicare AWV today. The following components were reviewed and updated:    Medical history  Family History  Social history  Allergies and Current Medications  Health Risk Assessment  Health Maintenance  Care Team    **See Completed Assessments for Annual Wellness visit with in the encounter summary    The following assessments were completed:  Depression Screening  Cognitive function Screening  Timed Get Up Test  Whisper Test      Opioid documentation:      Patient does not have a current opioid prescription.          Vitals:    12/09/23 1000   BP: 126/73   BP Location: Right arm   Patient Position: Sitting   BP Method: Large (Automatic)   Pulse: 68   Resp: 16   Temp: 98.2 °F (36.8 °C)   TempSrc: Oral   SpO2: 99%   Weight: 107.5 kg (237 lb)   Height: 5' 8" (1.727 m)     Body mass index is 36.04 kg/m².       Physical Exam  General appearance: alert, appears stated age, cooperative and no distress  Head: Normocephalic, without obvious abnormality, atraumatic  Neck: no adenopathy, no carotid bruit, no JVD and supple, symmetrical, trachea midline  Lungs: clear to auscultation bilaterally  Heart: regular rate and rhythm, S1, S2 normal, no murmur, click, rub or gallop  Abdomen: soft, non-tender; bowel sounds normal; no masses,  no organomegaly  Extremities: extremities normal, atraumatic, no cyanosis or edema  Pulses: 2+ and symmetric  Skin: Skin color, texture, turgor normal. No rashes or lesions  Neurologic: Grossly normal      Diagnoses and health risks identified today and associated recommendations/orders:  1. Screening for AAA (abdominal aortic aneurysm)  - US AAA Screening; Future    2. Other reduced mobility  - discussed PT, patient would like to wait before referral    3. Encounter for preventive health examination  - ordered Hep C, HIV, US AAA screening  - patient UTD on other screening  - [facility] to [facility]  - CSC done with in " July of 2023, with 2 year follow up [facility]      Provided Hayden with a 5-10 year written screening schedule and personal prevention plan. Recommendations were developed using the USPSTF age appropriate recommendations. Education, counseling, and referrals were provided as needed.  After Visit Summary printed and given to patient which includes a list of additional screenings\tests needed.    Follow up in about 1 year (around 12/9/2024) for annual medicare wellness visit.      Miriam Mahmood, DO

## 2023-12-11 ENCOUNTER — LAB VISIT (OUTPATIENT)
Dept: LAB | Facility: HOSPITAL | Age: 68
End: 2023-12-11
Attending: STUDENT IN AN ORGANIZED HEALTH CARE EDUCATION/TRAINING PROGRAM
Payer: MEDICARE

## 2023-12-11 DIAGNOSIS — Z13.6 SCREENING FOR AAA (ABDOMINAL AORTIC ANEURYSM): ICD-10-CM

## 2023-12-11 LAB
HCV AB SERPL QL IA: NONREACTIVE
HIV 1+2 AB+HIV1 P24 AG SERPL QL IA: NONREACTIVE

## 2023-12-11 PROCEDURE — 87389 HIV-1 AG W/HIV-1&-2 AB AG IA: CPT

## 2023-12-11 PROCEDURE — 86803 HEPATITIS C AB TEST: CPT

## 2023-12-11 PROCEDURE — 36415 COLL VENOUS BLD VENIPUNCTURE: CPT

## 2024-01-29 ENCOUNTER — HOSPITAL ENCOUNTER (OUTPATIENT)
Dept: RADIOLOGY | Facility: HOSPITAL | Age: 69
Discharge: HOME | End: 2024-01-29
Attending: STUDENT IN AN ORGANIZED HEALTH CARE EDUCATION/TRAINING PROGRAM
Payer: MEDICARE

## 2024-01-29 DIAGNOSIS — Z13.6 SCREENING FOR AAA (ABDOMINAL AORTIC ANEURYSM): ICD-10-CM

## 2024-01-29 PROCEDURE — 76706 US ABDL AORTA SCREEN AAA: CPT | Mod: TC

## 2024-04-11 ENCOUNTER — OFFICE VISIT (OUTPATIENT)
Dept: INTERNAL MEDICINE | Facility: CLINIC | Age: 69
End: 2024-04-11
Payer: MEDICARE

## 2024-04-11 ENCOUNTER — LAB VISIT (OUTPATIENT)
Dept: LAB | Facility: HOSPITAL | Age: 69
End: 2024-04-11
Attending: STUDENT IN AN ORGANIZED HEALTH CARE EDUCATION/TRAINING PROGRAM
Payer: MEDICARE

## 2024-04-11 VITALS
OXYGEN SATURATION: 98 % | BODY MASS INDEX: 31.78 KG/M2 | SYSTOLIC BLOOD PRESSURE: 134 MMHG | DIASTOLIC BLOOD PRESSURE: 81 MMHG | TEMPERATURE: 99 F | WEIGHT: 209.69 LBS | HEIGHT: 68 IN | RESPIRATION RATE: 20 BRPM | HEART RATE: 71 BPM

## 2024-04-11 DIAGNOSIS — G47.33 OSA (OBSTRUCTIVE SLEEP APNEA): ICD-10-CM

## 2024-04-11 DIAGNOSIS — E78.5 HYPERLIPIDEMIA, UNSPECIFIED HYPERLIPIDEMIA TYPE: Primary | ICD-10-CM

## 2024-04-11 DIAGNOSIS — G89.29 CHRONIC LOW BACK PAIN WITHOUT SCIATICA, UNSPECIFIED BACK PAIN LATERALITY: ICD-10-CM

## 2024-04-11 DIAGNOSIS — D64.9 ANEMIA, UNSPECIFIED TYPE: ICD-10-CM

## 2024-04-11 DIAGNOSIS — I10 HYPERTENSION, UNSPECIFIED TYPE: ICD-10-CM

## 2024-04-11 DIAGNOSIS — I10 PRIMARY HYPERTENSION: ICD-10-CM

## 2024-04-11 DIAGNOSIS — M54.50 CHRONIC LOW BACK PAIN WITHOUT SCIATICA, UNSPECIFIED BACK PAIN LATERALITY: ICD-10-CM

## 2024-04-11 DIAGNOSIS — L91.8 SKIN TAG: ICD-10-CM

## 2024-04-11 LAB
BASOPHILS # BLD AUTO: 0.02 X10(3)/MCL
BASOPHILS NFR BLD AUTO: 0.4 %
EOSINOPHIL # BLD AUTO: 0.15 X10(3)/MCL (ref 0–0.9)
EOSINOPHIL NFR BLD AUTO: 2.9 %
ERYTHROCYTE [DISTWIDTH] IN BLOOD BY AUTOMATED COUNT: 12.3 % (ref 11.5–17)
HCT VFR BLD AUTO: 39.1 % (ref 42–52)
HGB BLD-MCNC: 13.1 G/DL (ref 14–18)
IMM GRANULOCYTES # BLD AUTO: 0.03 X10(3)/MCL (ref 0–0.04)
IMM GRANULOCYTES NFR BLD AUTO: 0.6 %
LYMPHOCYTES # BLD AUTO: 1.35 X10(3)/MCL (ref 0.6–4.6)
LYMPHOCYTES NFR BLD AUTO: 25.7 %
MCH RBC QN AUTO: 29.5 PG (ref 27–31)
MCHC RBC AUTO-ENTMCNC: 33.5 G/DL (ref 33–36)
MCV RBC AUTO: 88.1 FL (ref 80–94)
MONOCYTES # BLD AUTO: 0.54 X10(3)/MCL (ref 0.1–1.3)
MONOCYTES NFR BLD AUTO: 10.3 %
NEUTROPHILS # BLD AUTO: 3.16 X10(3)/MCL (ref 2.1–9.2)
NEUTROPHILS NFR BLD AUTO: 60.1 %
NRBC BLD AUTO-RTO: 0 %
PLATELET # BLD AUTO: 268 X10(3)/MCL (ref 130–400)
PMV BLD AUTO: 10.4 FL (ref 7.4–10.4)
RBC # BLD AUTO: 4.44 X10(6)/MCL (ref 4.7–6.1)
WBC # SPEC AUTO: 5.25 X10(3)/MCL (ref 4.5–11.5)

## 2024-04-11 PROCEDURE — 99214 OFFICE O/P EST MOD 30 MIN: CPT | Mod: PBBFAC | Performed by: STUDENT IN AN ORGANIZED HEALTH CARE EDUCATION/TRAINING PROGRAM

## 2024-04-11 PROCEDURE — 36415 COLL VENOUS BLD VENIPUNCTURE: CPT

## 2024-04-11 PROCEDURE — 85025 COMPLETE CBC W/AUTO DIFF WBC: CPT

## 2024-04-11 RX ORDER — AMLODIPINE BESYLATE 10 MG/1
10 TABLET ORAL DAILY
Qty: 90 TABLET | Refills: 3 | Status: SHIPPED | OUTPATIENT
Start: 2024-04-11 | End: 2025-04-11

## 2024-04-11 RX ORDER — ROSUVASTATIN CALCIUM 20 MG/1
20 TABLET, COATED ORAL DAILY
Qty: 90 TABLET | Refills: 3 | Status: SHIPPED | OUTPATIENT
Start: 2024-04-11 | End: 2025-04-11

## 2024-04-11 RX ORDER — FAMOTIDINE 40 MG/1
40 TABLET, FILM COATED ORAL DAILY
Qty: 30 TABLET | Refills: 11 | Status: SHIPPED | OUTPATIENT
Start: 2024-04-11 | End: 2025-04-11

## 2024-04-11 RX ORDER — PANTOPRAZOLE SODIUM 40 MG/1
40 TABLET, DELAYED RELEASE ORAL DAILY
Qty: 90 TABLET | Refills: 3 | Status: SHIPPED | OUTPATIENT
Start: 2024-04-11 | End: 2025-04-11

## 2024-04-11 RX ORDER — FERROUS SULFATE 325(65) MG
325 TABLET, DELAYED RELEASE (ENTERIC COATED) ORAL EVERY OTHER DAY
Qty: 30 TABLET | Refills: 2 | Status: SHIPPED | OUTPATIENT
Start: 2024-04-11

## 2024-04-11 NOTE — PROGRESS NOTES
Patient ID: Hayden Aquino is a 68 y.o. male.    Chief Complaint: Follow-up (Pt here today for f/u visit. Took a.m. meds today. Pt concerned about left upper skin tag. Wants it removed. )    Patient is a 67-year-old male with past medical history of hypertension, hyperlipidemia, obesity, chronic back pain, LATOSHA who presents to Internal Medicine Clinic for scheduled follow-up.  He states he has been doing well since his last visit.  Blood pressure has remained under good control, continues to take all medicines as prescribed.  Patient is still concerned about blood count slightly low, does continue to take iron supplementation.  He states he has had consistent colonoscopies with polyp removal.  He states he remains compliant with CPAP, has been getting continued good sleep.  He states pain has been better controlled, continues to stay active as tolerated.  No other acute concerns at this time      Review of Systems   Constitutional:  Positive for activity change. Negative for unexpected weight change.   HENT:  Negative for hearing loss, rhinorrhea and trouble swallowing.    Eyes:  Negative for discharge and visual disturbance.   Respiratory:  Negative for chest tightness and wheezing.    Cardiovascular:  Negative for palpitations.   Gastrointestinal:  Negative for blood in stool, constipation and diarrhea.   Endocrine: Positive for polyuria. Negative for polydipsia.   Genitourinary:  Positive for urgency. Negative for difficulty urinating and hematuria.   Psychiatric/Behavioral:  Positive for confusion. Negative for dysphoric mood.           Objective     Physical Exam  Constitutional:       Appearance: Normal appearance. He is obese.   HENT:      Head: Normocephalic and atraumatic.   Cardiovascular:      Rate and Rhythm: Normal rate and regular rhythm.      Heart sounds: Normal heart sounds.   Pulmonary:      Breath sounds: Normal breath sounds.      Comments: Slightly decreased  Abdominal:      Palpations:  Abdomen is soft.   Musculoskeletal:         General: No swelling or tenderness.      Right lower leg: No edema.      Left lower leg: No edema.   Skin:     Comments: Skin tag present on posterior left thigh   Neurological:      General: No focal deficit present.      Mental Status: He is alert and oriented to person, place, and time.            Assessment and Plan     1. Hyperlipidemia, unspecified hyperlipidemia type  -     rosuvastatin (CRESTOR) 20 MG tablet; Take 1 tablet (20 mg total) by mouth once daily.  Dispense: 90 tablet; Refill: 3    2. Hypertension, unspecified type  -     amLODIPine (NORVASC) 10 MG tablet; Take 1 tablet (10 mg total) by mouth once daily.  Dispense: 90 tablet; Refill: 3    3. Anemia, unspecified type  -     CBC Auto Differential; Future; Expected date: 04/11/2024    4. Primary hypertension    5. Chronic low back pain without sciatica, unspecified back pain laterality    6. LATOSHA (obstructive sleep apnea)    Other orders  -     ferrous sulfate 325 (65 FE) MG EC tablet; Take 1 tablet (325 mg total) by mouth every other day.  Dispense: 30 tablet; Refill: 2  -     pantoprazole (PROTONIX) 40 MG tablet; Take 1 tablet (40 mg total) by mouth once daily.  Dispense: 90 tablet; Refill: 3  -     famotidine (PEPCID) 40 MG tablet; Take 1 tablet (40 mg total) by mouth once daily.  Dispense: 30 tablet; Refill: 11      Hypertension  -should continue taking Norvasc 10 mg daily, lisinopril-hydrochlorothiazide 10-12.5 mg daily  -blood pressure at goal today  -should continue to check blood pressures at home    Hyperlipidemia  -currently taking Crestor 10 mg daily  -last lipid panel at goal    LATOSHA  -recently saw neurology for LATOSHA, has been set up with new BiPAP and is doing well.     Normocytic anemia   -patient had recent colonoscopy and EGD, colonoscopy mostly normal but EGD showed multiple ulcers  -should remain on Protonix and Pepcid for now   -will repeat CBC to further evaluate hemoglobin and  hematocrit    Chronic back pain   -continue taking Voltaren 75 mg as needed for when pain is more severe   -much improved from previous, continue current regimen    Skin tag, left posterior thigh  -will refer to minor procedure clinic for removal    Refilled medications as appropriate   RTC 6 months    [facility]       Follow up in about 6 months (around 10/11/2024).

## 2024-05-14 ENCOUNTER — OFFICE VISIT (OUTPATIENT)
Dept: FAMILY MEDICINE | Facility: CLINIC | Age: 69
End: 2024-05-14
Payer: MEDICARE

## 2024-05-14 VITALS
TEMPERATURE: 99 F | HEIGHT: 68 IN | DIASTOLIC BLOOD PRESSURE: 81 MMHG | BODY MASS INDEX: 31.37 KG/M2 | SYSTOLIC BLOOD PRESSURE: 134 MMHG | OXYGEN SATURATION: 99 % | RESPIRATION RATE: 18 BRPM | WEIGHT: 207 LBS

## 2024-05-14 DIAGNOSIS — E78.5 HYPERLIPIDEMIA, UNSPECIFIED HYPERLIPIDEMIA TYPE: Primary | ICD-10-CM

## 2024-05-14 DIAGNOSIS — L91.8 SKIN TAG: ICD-10-CM

## 2024-05-14 PROCEDURE — 11200 RMVL SKIN TAGS UP TO&INC 15: CPT | Mod: PBBFAC | Performed by: FAMILY MEDICINE

## 2024-05-14 PROCEDURE — 99214 OFFICE O/P EST MOD 30 MIN: CPT | Mod: PBBFAC

## 2024-05-14 RX ORDER — LIDOCAINE HYDROCHLORIDE 10 MG/ML
2 INJECTION, SOLUTION EPIDURAL; INFILTRATION; INTRACAUDAL; PERINEURAL
Status: COMPLETED | OUTPATIENT
Start: 2024-05-14 | End: 2024-05-14

## 2024-05-14 RX ADMIN — LIDOCAINE HYDROCHLORIDE 20 MG: 10 INJECTION, SOLUTION EPIDURAL; INFILTRATION; INTRACAUDAL; PERINEURAL at 10:05

## 2024-05-14 NOTE — PROGRESS NOTES
"[facility] Family Medicine Office Visit Note    Subjective:       Patient ID: Hayden Aquino is a 68 y.o. male.    Chief Complaint: skin tag      HPI:  68 y.o. male presents to [facility] Medicine clinic for eval for skin tag removal on L posterior thigh.    Skin tag approx 1.5cm in length located to posterior L thigh. Gets caught on clothing and knicked when pt shaves with clippers. Present for "a long time" and pt has been wanting to get it removed but had not been able to. Wants it removed today.    Review of Systems:  Gen: denies fever and chills  Skin: see above hpi    Objective:      /81   Temp 98.6 °F (37 °C)   Resp 18   Ht 5' 7.72" (1.72 m)   Wt 93.9 kg (207 lb)   SpO2 99%   BMI 31.74 kg/m²     Physical Exam:  Gen: alert and oriented, NAD  CV/Resp: breathing nonlabored on room air  Skin: approx 1.5cm skin tag located to posterior L thigh. No swelling, redness, pain to surrounding skin. No warmth.    Procedure: Shave excision of skin tag  Performed by Sadaf Carlson MD  Supervised by Lucita Mcknight MD    After obtaining informed consent,  a time out was performed. The area was prepped and draped in the usual fashion. Anesthesia was obtained with 1% lidocaine without epinephrine. The skin tag was removed by shave excision with dermablade. Hemostasis obtained with hyfrecator after attempts with pressure and drysol. Bandage applied over wounds. Wound care discussed. Patient tolerated procedure well without complications. Specimen was not sent for dermatopathology.     Current Outpatient Medications   Medication Instructions    amLODIPine (NORVASC) 10 mg, Oral, Daily    diclofenac (VOLTAREN) 75 mg    famotidine (PEPCID) 40 mg, Oral, Daily    ferrous sulfate 325 mg, Oral, Every other day    lisinopriL-hydrochlorothiazide (PRINZIDE,ZESTORETIC) 10-12.5 mg per tablet 1 tablet, Oral, Daily    pantoprazole (PROTONIX) 40 mg, Oral, Daily    rosuvastatin (CRESTOR) 20 mg, Oral, Daily    traZODone (DESYREL) 300 mg      "   Assessment/Plan:     1. Skin tag        Orders Placed This Encounter    LIDOcaine (PF) 10 mg/ml (1%) injection 20 mg       Removal as above.     Rtc prn

## 2024-05-16 NOTE — PROGRESS NOTES
I have seen and examined the patient with the resident at the time of the appointment. The chart was reviewed. I agree with the assessment and plan. Care provided was reasonable and necessary. I was present for the procedure.    Post care instructions and return precautions discussed.

## 2024-07-19 ENCOUNTER — PATIENT MESSAGE (OUTPATIENT)
Dept: NEUROLOGY | Facility: CLINIC | Age: 69
End: 2024-07-19
Payer: MEDICARE

## 2024-07-19 ENCOUNTER — HOSPITAL ENCOUNTER (EMERGENCY)
Facility: HOSPITAL | Age: 69
Discharge: HOME | End: 2024-07-19
Attending: EMERGENCY MEDICINE
Payer: MEDICARE

## 2024-07-19 VITALS
WEIGHT: 204.13 LBS | SYSTOLIC BLOOD PRESSURE: 136 MMHG | HEIGHT: 67 IN | DIASTOLIC BLOOD PRESSURE: 76 MMHG | BODY MASS INDEX: 32.04 KG/M2 | OXYGEN SATURATION: 97 % | HEART RATE: 57 BPM | RESPIRATION RATE: 20 BRPM | TEMPERATURE: 98 F

## 2024-07-19 DIAGNOSIS — E11.69 TYPE 2 DIABETES MELLITUS WITH OTHER SPECIFIED COMPLICATION, WITHOUT LONG-TERM CURRENT USE OF INSULIN: ICD-10-CM

## 2024-07-19 DIAGNOSIS — R73.9 HYPERGLYCEMIA: Primary | ICD-10-CM

## 2024-07-19 LAB
ALBUMIN SERPL-MCNC: 4.2 G/DL (ref 3.4–4.8)
ALBUMIN/GLOB SERPL: 1.1 RATIO (ref 1.1–2)
ALP SERPL-CCNC: 86 UNIT/L (ref 40–150)
ALT SERPL-CCNC: 18 UNIT/L (ref 0–55)
ANION GAP SERPL CALC-SCNC: 11 MEQ/L
AST SERPL-CCNC: 18 UNIT/L (ref 5–34)
BACTERIA #/AREA URNS AUTO: ABNORMAL /HPF
BASOPHILS # BLD AUTO: 0.01 X10(3)/MCL
BASOPHILS NFR BLD AUTO: 0.2 %
BILIRUB SERPL-MCNC: 0.3 MG/DL
BILIRUB UR QL STRIP.AUTO: NEGATIVE
BUN SERPL-MCNC: 16.8 MG/DL (ref 8.4–25.7)
CALCIUM SERPL-MCNC: 10.1 MG/DL (ref 8.8–10)
CHLORIDE SERPL-SCNC: 101 MMOL/L (ref 98–107)
CLARITY UR: CLEAR
CO2 SERPL-SCNC: 26 MMOL/L (ref 23–31)
COLOR UR AUTO: COLORLESS
CREAT SERPL-MCNC: 1.44 MG/DL (ref 0.73–1.18)
CREAT/UREA NIT SERPL: 12
EOSINOPHIL # BLD AUTO: 0.11 X10(3)/MCL (ref 0–0.9)
EOSINOPHIL NFR BLD AUTO: 2.1 %
ERYTHROCYTE [DISTWIDTH] IN BLOOD BY AUTOMATED COUNT: 12.4 % (ref 11.5–17)
EST. AVERAGE GLUCOSE BLD GHB EST-MCNC: ABNORMAL MG/DL
GFR SERPLBLD CREATININE-BSD FMLA CKD-EPI: 53 ML/MIN/1.73/M2
GLOBULIN SER-MCNC: 3.7 GM/DL (ref 2.4–3.5)
GLUCOSE SERPL-MCNC: 390 MG/DL (ref 82–115)
GLUCOSE UR QL STRIP: ABNORMAL
HBA1C MFR BLD: >14 %
HCT VFR BLD AUTO: 38.9 % (ref 42–52)
HGB BLD-MCNC: 12.9 G/DL (ref 14–18)
HGB UR QL STRIP: NEGATIVE
HYALINE CASTS #/AREA URNS LPF: ABNORMAL /LPF
IMM GRANULOCYTES # BLD AUTO: 0.01 X10(3)/MCL (ref 0–0.04)
IMM GRANULOCYTES NFR BLD AUTO: 0.2 %
KETONES UR QL STRIP: ABNORMAL
LEUKOCYTE ESTERASE UR QL STRIP: NEGATIVE
LIPASE SERPL-CCNC: 54 U/L
LYMPHOCYTES # BLD AUTO: 1.41 X10(3)/MCL (ref 0.6–4.6)
LYMPHOCYTES NFR BLD AUTO: 27.5 %
MAGNESIUM SERPL-MCNC: 1.9 MG/DL (ref 1.6–2.6)
MCH RBC QN AUTO: 28.6 PG (ref 27–31)
MCHC RBC AUTO-ENTMCNC: 33.2 G/DL (ref 33–36)
MCV RBC AUTO: 86.3 FL (ref 80–94)
MONOCYTES # BLD AUTO: 0.48 X10(3)/MCL (ref 0.1–1.3)
MONOCYTES NFR BLD AUTO: 9.4 %
MUCOUS THREADS URNS QL MICRO: ABNORMAL /LPF
NEUTROPHILS # BLD AUTO: 3.11 X10(3)/MCL (ref 2.1–9.2)
NEUTROPHILS NFR BLD AUTO: 60.6 %
NITRITE UR QL STRIP: NEGATIVE
NRBC BLD AUTO-RTO: 0 %
PH UR STRIP: 5 [PH]
PLATELET # BLD AUTO: 263 X10(3)/MCL (ref 130–400)
PMV BLD AUTO: 10.6 FL (ref 7.4–10.4)
POCT GLUCOSE: 322 MG/DL (ref 70–110)
POCT GLUCOSE: 335 MG/DL (ref 70–110)
POCT GLUCOSE: 421 MG/DL (ref 70–110)
POTASSIUM SERPL-SCNC: 4.2 MMOL/L (ref 3.5–5.1)
PROT SERPL-MCNC: 7.9 GM/DL (ref 5.8–7.6)
PROT UR QL STRIP: NEGATIVE
RBC # BLD AUTO: 4.51 X10(6)/MCL (ref 4.7–6.1)
RBC #/AREA URNS AUTO: ABNORMAL /HPF
SODIUM SERPL-SCNC: 138 MMOL/L (ref 136–145)
SP GR UR STRIP.AUTO: 1.02 (ref 1–1.03)
SQUAMOUS #/AREA URNS LPF: ABNORMAL /HPF
UROBILINOGEN UR STRIP-ACNC: NORMAL
WBC # BLD AUTO: 5.13 X10(3)/MCL (ref 4.5–11.5)
WBC #/AREA URNS AUTO: ABNORMAL /HPF

## 2024-07-19 PROCEDURE — 83735 ASSAY OF MAGNESIUM: CPT | Performed by: NURSE PRACTITIONER

## 2024-07-19 PROCEDURE — 81001 URINALYSIS AUTO W/SCOPE: CPT | Performed by: NURSE PRACTITIONER

## 2024-07-19 PROCEDURE — 63600175 PHARM REV CODE 636 W HCPCS: Performed by: NURSE PRACTITIONER

## 2024-07-19 PROCEDURE — 83690 ASSAY OF LIPASE: CPT | Performed by: NURSE PRACTITIONER

## 2024-07-19 PROCEDURE — 80053 COMPREHEN METABOLIC PANEL: CPT | Performed by: NURSE PRACTITIONER

## 2024-07-19 PROCEDURE — 82962 GLUCOSE BLOOD TEST: CPT

## 2024-07-19 PROCEDURE — 96360 HYDRATION IV INFUSION INIT: CPT

## 2024-07-19 PROCEDURE — 83036 HEMOGLOBIN GLYCOSYLATED A1C: CPT | Performed by: NURSE PRACTITIONER

## 2024-07-19 PROCEDURE — 85025 COMPLETE CBC W/AUTO DIFF WBC: CPT | Performed by: NURSE PRACTITIONER

## 2024-07-19 PROCEDURE — 96372 THER/PROPH/DIAG INJ SC/IM: CPT | Performed by: NURSE PRACTITIONER

## 2024-07-19 PROCEDURE — 99284 EMERGENCY DEPT VISIT MOD MDM: CPT | Mod: 25

## 2024-07-19 RX ORDER — INSULIN ASPART 100 [IU]/ML
8 INJECTION, SOLUTION INTRAVENOUS; SUBCUTANEOUS
Status: COMPLETED | OUTPATIENT
Start: 2024-07-19 | End: 2024-07-19

## 2024-07-19 RX ORDER — LANCETS
1 EACH MISCELLANEOUS 2 TIMES DAILY
Qty: 50 EACH | Refills: 3 | Status: SHIPPED | OUTPATIENT
Start: 2024-07-19

## 2024-07-19 RX ORDER — INSULIN PUMP SYRINGE, 3 ML
EACH MISCELLANEOUS
Qty: 1 EACH | Refills: 0 | Status: SHIPPED | OUTPATIENT
Start: 2024-07-19

## 2024-07-19 RX ADMIN — INSULIN ASPART 8 UNITS: 100 INJECTION, SOLUTION INTRAVENOUS; SUBCUTANEOUS at 11:07

## 2024-07-19 RX ADMIN — SODIUM CHLORIDE, POTASSIUM CHLORIDE, SODIUM LACTATE AND CALCIUM CHLORIDE 1000 ML: 600; 310; 30; 20 INJECTION, SOLUTION INTRAVENOUS at 10:07

## 2024-07-19 NOTE — ED PROVIDER NOTES
Encounter Date: 2024       History     Chief Complaint   Patient presents with    Abnormal Lab     C/o pcp keeps canceling appts. Last lab work  glucose was elevated. Got a call to come to ER for lab work prior to new scheduled visit and to make sure his glucose was ok. Visit on     Hyperglycemia     Cbg 421     Pt is a 68 y.o. male who presents to the [facility] ED for evaluation for elevated blood sugar. Reports being on metformin in the past for his DM however his clinician canceled it. Pt uncertain of what his current DM medication is. [facility]. Reports last having diagnostic testing in  and was sent to the ED through a phone call by his new provider due to the abnormal results noted. Pt denies chest pain, SOB, weakness, dizziness, polyuria, or loss of bowel or bladder control. Admits to mild thirst at times. Current CBG is 421.      Review of patient's allergies indicates:  No Known Allergies  Past Medical History:   Diagnosis Date    Disorder of kidney and ureter 2022    GERD (gastroesophageal reflux disease)     Headache 2015    Hypertension 2015     Past Surgical History:   Procedure Laterality Date    SPINE SURGERY  2012    Lower back surgery     Family History   Problem Relation Name Age of Onset    Hypertension Mother      Cancer Father Hayden Aquino          1983     Social History     Tobacco Use    Smoking status: Former     Current packs/day: 0.00     Average packs/day: 0.5 packs/day for 48.0 years (24.0 ttl pk-yrs)     Types: Cigarettes     Start date: 1974     Quit date: 2022     Years since quittin.1    Smokeless tobacco: Never    Tobacco comments:     Quit twice, approx. 4 to 5 yrs. Each   Substance Use Topics    Alcohol use: Not Currently     Alcohol/week: 23.0 standard drinks of alcohol     Types: 3 Glasses of wine, 10 Cans of beer, 4 Shots of liquor, 6 Drinks containing 0.5 oz of alcohol per week      Comment: stopped 18 months ago    Drug use: Never     Review of Systems   Constitutional:  Negative for chills, diaphoresis, fatigue and fever.   HENT:  Negative for facial swelling, postnasal drip, rhinorrhea, sinus pressure, sinus pain, sore throat and trouble swallowing.    Respiratory:  Negative for cough, chest tightness, shortness of breath and wheezing.    Cardiovascular:  Negative for chest pain, palpitations and leg swelling.   Gastrointestinal:  Negative for abdominal pain, diarrhea, nausea and vomiting.   Genitourinary:  Negative for dysuria, flank pain, hematuria and urgency.   Musculoskeletal:  Negative for arthralgias, back pain and myalgias.   Skin:  Negative for color change and rash.   Neurological:  Negative for dizziness, syncope, weakness and headaches.   Hematological:  Does not bruise/bleed easily.   All other systems reviewed and are negative.      Physical Exam     Initial Vitals [07/19/24 0942]   BP Pulse Resp Temp SpO2   136/87 79 20 97.7 °F (36.5 °C) 98 %      MAP       --         Physical Exam    Nursing note and vitals reviewed.  Constitutional: Vital signs are normal. He appears well-developed and well-nourished.   HENT:   Head: Normocephalic.   Nose: Nose normal.   Mouth/Throat: Oropharynx is clear and moist.   Eyes: Conjunctivae and EOM are normal. Pupils are equal, round, and reactive to light.   Neck: Neck supple.   Normal range of motion.  Cardiovascular:  Normal rate, regular rhythm, normal heart sounds and intact distal pulses.           Pulmonary/Chest: Effort normal and breath sounds normal. No respiratory distress. He has no wheezes. He has no rhonchi. He has no rales. He exhibits no tenderness.   Abdominal: Abdomen is soft and flat. Bowel sounds are normal. There is no abdominal tenderness. There is no rebound, no guarding, no tenderness at McBurney's point and negative Glover's sign.   Musculoskeletal:         General: Normal range of motion.      Cervical back: Normal  range of motion and neck supple.     Neurological: He is alert and oriented to person, place, and time. He has normal strength.   Skin: Skin is warm and dry. Capillary refill takes less than 2 seconds.   Psychiatric: He has a normal mood and affect. His behavior is normal. Judgment and thought content normal.         ED Course   Procedures  Labs Reviewed   URINALYSIS, REFLEX TO URINE CULTURE - Abnormal; Notable for the following components:       Result Value    Color, UA Colorless (*)     Glucose, UA 4+ (*)     Ketones, UA 2+ (*)     Mucous, UA Trace (*)     All other components within normal limits   COMPREHENSIVE METABOLIC PANEL - Abnormal; Notable for the following components:    Glucose 390 (*)     Creatinine 1.44 (*)     Calcium 10.1 (*)     Protein Total 7.9 (*)     Globulin 3.7 (*)     All other components within normal limits   HEMOGLOBIN A1C - Abnormal; Notable for the following components:    Hemoglobin A1c >14.0 (*)     All other components within normal limits   CBC WITH DIFFERENTIAL - Abnormal; Notable for the following components:    RBC 4.51 (*)     Hgb 12.9 (*)     Hct 38.9 (*)     MPV 10.6 (*)     All other components within normal limits   POCT GLUCOSE - Abnormal; Notable for the following components:    POCT Glucose 421 (*)     All other components within normal limits   POCT GLUCOSE - Abnormal; Notable for the following components:    POCT Glucose 335 (*)     All other components within normal limits   POCT GLUCOSE - Abnormal; Notable for the following components:    POCT Glucose 322 (*)     All other components within normal limits   LIPASE - Normal   MAGNESIUM - Normal   CBC W/ AUTO DIFFERENTIAL    Narrative:     The following orders were created for panel order CBC Auto Differential.  Procedure                               Abnormality         Status                     ---------                               -----------         ------                     CBC with Differential[[id number]]        Abnormal            Final result                 Please view results for these tests on the individual orders.   EXTRA TUBES    Narrative:     The following orders were created for panel order EXTRA TUBES.  Procedure                               Abnormality         Status                     ---------                               -----------         ------                     Light Blue Top Hold[[id number]]                                                        Red Top Hold[[id number]]                                                               Light Green Top Hold[[id number]]                                                       Gold Top Hold[[id number]]                                                              Pink Top Hold[[id number]]                                                                Please view results for these tests on the individual orders.   LIGHT BLUE TOP HOLD   RED TOP HOLD   LIGHT GREEN TOP HOLD   GOLD TOP HOLD   PINK TOP HOLD   POCT GLUCOSE MONITORING CONTINUOUS   POCT GLUCOSE MONITORING CONTINUOUS          Imaging Results    None          Medications   lactated ringers bolus 999 mL (0 mLs Intravenous Stopped 7/19/24 1112)   insulin aspart U-100 injection 8 Units (8 Units Subcutaneous Given 7/19/24 1157)     Medical Decision Making  Differential:  DM  DKA  Electrolyte imbalance      Amount and/or Complexity of Data Reviewed  Labs: ordered.    Risk  Prescription drug management.               ED Course as of 07/19/24 1408   Fri Jul 19, 2024   1328 Given strict ED return precautions. I have spoken with the patient and/or caregivers. I have explained the patient's condition, diagnoses and treatment plan based on the information available to me at this time. I have answered the patient's and/or caregiver's questions and addressed any concerns. The patient and/or caregivers have as good an understanding of the patient's diagnosis, condition and treatment plan as can be expected at this  point. The vital signs have been stable. The patient's condition is stable and appropriate for discharge from the emergency department.      The patient will pursue further outpatient evaluation with the primary care physician or other designated or consulting physician as outlined in the discharge instructions. The patient and/or caregivers are agreeable to this plan of care and follow-up instructions have been explained in detail. The patient and/or caregivers have received these instructions in written format and have expressed an understanding of the discharge instructions. The patient and/or caregivers are aware that any significant change in condition or worsening of symptoms should prompt an immediate return to this or the closest emergency department or a call to 911.   [JA]      ED Course User Index  [JA] Paolo Atkins Jr., FNP                           Clinical Impression:  Final diagnoses:  [R73.9] Hyperglycemia (Primary)  [E11.69] Type 2 diabetes mellitus with other specified complication, without long-term current use of insulin          ED Disposition Condition    Discharge Stable          ED Prescriptions       Medication Sig Dispense Start Date End Date Auth. Provider    SITagliptin phosphate (JANUVIA) 100 MG Tab Take 1 tablet (100 mg total) by mouth once daily. 30 tablet 7/19/2024 8/18/2024 Paolo Atkins Jr., FNP    blood sugar diagnostic Strp 1 each by Misc.(Non-Drug; Combo Route) route 2 (two) times a day. 50 each 7/19/2024 -- Paolo Atkins Jr., FNP    lancets (ONETOUCH ULTRASOFT LANCETS) Misc 1 Box  by Misc.(Non-Drug; Combo Route) route 2 (two) times a day. 50 each 7/19/2024 -- Paolo Atkins Jr., FNP    blood-glucose meter (FREESTYLE SYSTEM KIT) kit Use as instructed 1 each 7/19/2024 -- Paolo Atkins Jr., FNP          Follow-up Information       Follow up With Specialties Details Why Contact Info    [facility]  Schedule an appointment as soon as possible for a visit  in 1 week Follow up with [facility] to obtain a PCP [address] [facility] [address]    [facility] Emergency Dept Emergency Medicine In 3 days As needed, If symptoms worsen [address] [facility] [address] [phone]             Paolo Atkins Jr., [facility]  07/19/24 8273

## 2024-07-24 ENCOUNTER — OFFICE VISIT (OUTPATIENT)
Dept: NEUROLOGY | Facility: CLINIC | Age: 69
End: 2024-07-24
Payer: MEDICARE

## 2024-07-24 VITALS
HEIGHT: 67 IN | WEIGHT: 206 LBS | DIASTOLIC BLOOD PRESSURE: 84 MMHG | BODY MASS INDEX: 32.33 KG/M2 | SYSTOLIC BLOOD PRESSURE: 126 MMHG

## 2024-07-24 DIAGNOSIS — G47.33 OSA (OBSTRUCTIVE SLEEP APNEA): Primary | ICD-10-CM

## 2024-07-24 PROBLEM — E66.01 SEVERE OBESITY (BMI 35.0-39.9) WITH COMORBIDITY: Status: RESOLVED | Noted: 2023-07-05 | Resolved: 2024-07-24

## 2024-07-24 PROCEDURE — 99213 OFFICE O/P EST LOW 20 MIN: CPT | Mod: S$GLB,,, | Performed by: SPECIALIST

## 2024-07-24 PROCEDURE — 1101F PT FALLS ASSESS-DOCD LE1/YR: CPT | Mod: CPTII,S$GLB,, | Performed by: SPECIALIST

## 2024-07-24 PROCEDURE — 3074F SYST BP LT 130 MM HG: CPT | Mod: CPTII,S$GLB,, | Performed by: SPECIALIST

## 2024-07-24 PROCEDURE — 1159F MED LIST DOCD IN RCRD: CPT | Mod: CPTII,S$GLB,, | Performed by: SPECIALIST

## 2024-07-24 PROCEDURE — 99999 PR PBB SHADOW E&M-EST. PATIENT-LVL III: CPT | Mod: PBBFAC,,, | Performed by: SPECIALIST

## 2024-07-24 PROCEDURE — 3008F BODY MASS INDEX DOCD: CPT | Mod: CPTII,S$GLB,, | Performed by: SPECIALIST

## 2024-07-24 PROCEDURE — 3079F DIAST BP 80-89 MM HG: CPT | Mod: CPTII,S$GLB,, | Performed by: SPECIALIST

## 2024-07-24 PROCEDURE — 3046F HEMOGLOBIN A1C LEVEL >9.0%: CPT | Mod: CPTII,S$GLB,, | Performed by: SPECIALIST

## 2024-07-24 PROCEDURE — 4010F ACE/ARB THERAPY RXD/TAKEN: CPT | Mod: CPTII,S$GLB,, | Performed by: SPECIALIST

## 2024-07-24 PROCEDURE — 3288F FALL RISK ASSESSMENT DOCD: CPT | Mod: CPTII,S$GLB,, | Performed by: SPECIALIST

## 2024-07-24 NOTE — PROGRESS NOTES
"Subjective:       Patient ID: Hayden Aquino is a 68 y.o. male.    Chief Complaint: sleep apnea follow up; or other____    HPI:            Here for 1 yr baldomero f/u//Pt reports nightly usage of pap machine; pap therapy beneficial for sleep. Refreshed awakenings, denies EDS. Tolerating mask/pressure well. Needs supply order; DME is [facility].//PAP data: //Date range: 6/25/24-7/24/24/% of usage >= 4 hrs: 93%/AHI: 2.4/  Recently dx w dm on medication now     Review of Systems        7/24/2024     1:02 PM   EPWORTH SLEEPINESS SCALE   Sitting and reading 2   Watching TV 2   Sitting, inactive in a public place (e.g. a theatre or a meeting) 0   As a passenger in a car for an hour without a break 0   Lying down to rest in the afternoon when circumstances permit 2   Sitting and talking to someone 0   Sitting quietly after a lunch without alcohol 1   In a car, while stopped for a few minutes in traffic 0   Total score 7         Current Outpatient Medications   Medication Instructions    amLODIPine (NORVASC) 10 mg, Oral, Daily    blood sugar diagnostic Strp 1 each, Misc.(Non-Drug; Combo Route), 2 times daily    blood-glucose meter (FREESTYLE SYSTEM KIT) kit Use as instructed    ferrous sulfate 325 mg, Oral, Every other day    lancets (ONETOUCH ULTRASOFT LANCETS) Misc 1 Box , Misc.(Non-Drug; Combo Route), 2 times daily    lisinopriL-hydrochlorothiazide (PRINZIDE,ZESTORETIC) 10-12.5 mg per tablet 1 tablet, Oral, Daily    pantoprazole (PROTONIX) 40 mg, Oral, Daily    SITagliptin phosphate (JANUVIA) 100 mg, Oral, Daily     Objective:        Exam:   /84 (BP Location: Left arm, Patient Position: Sitting)   Ht 5' 7" (1.702 m)   Wt 93.4 kg (206 lb)   BMI 32.26 kg/m²   Body mass index is 32.26 kg/m².    General Exam  if accompanied, by__  body habitus_  mental status_alert and appropriate  oropharynx_Mallampati grade_  neck_  Heart__ RRR  extremities_  skin_    Neurological    Speech __ normal   cranial nerves:  CN 2 VF_ok   CN " 7_no lower face asymmetry  CN 12 tongue_ok  motor__  gait_ indep   Labs:      Lengthy discussion about the risks of untreated moderate to severe obstructive sleep apnea (LATOSHA).   Testing modalities/test options for sleep apnea discussed.  Potential need for treatment of LATOSHA discussed including CPAP or Bilevel  PAP.   Alternatives to PAP discussed if PAP not ultimately tolerated.  Risks of drowsy driving discussed.  Weight loss discussed if patient is overweight.            Assessment/Plan:       Problem List Items Addressed This Visit          Other    LATOSHA (obstructive sleep apnea) - Primary           Other comments/ follow up:          Orders Placed This Encounter   Procedures    CPAP/BIPAP SUPPLIES

## 2024-07-26 DIAGNOSIS — G47.33 OSA (OBSTRUCTIVE SLEEP APNEA): Primary | ICD-10-CM

## 2024-10-17 RX ORDER — SITAGLIPTIN AND METFORMIN HYDROCHLORIDE 1000; 50 MG/1; MG/1
1 TABLET, FILM COATED ORAL 2 TIMES DAILY WITH MEALS
COMMUNITY
Start: 2024-07-25 | End: 2025-07-25

## 2024-10-17 RX ORDER — ROSUVASTATIN CALCIUM 20 MG/1
20 TABLET, COATED ORAL DAILY
COMMUNITY
Start: 2024-09-19

## 2024-10-23 ENCOUNTER — CLINICAL SUPPORT (OUTPATIENT)
Dept: INTERNAL MEDICINE | Facility: CLINIC | Age: 69
End: 2024-10-23
Payer: MEDICARE

## 2024-10-23 ENCOUNTER — HOSPITAL ENCOUNTER (OUTPATIENT)
Dept: RADIOLOGY | Facility: HOSPITAL | Age: 69
Discharge: HOME | End: 2024-10-23
Payer: MEDICARE

## 2024-10-23 ENCOUNTER — OFFICE VISIT (OUTPATIENT)
Dept: INTERNAL MEDICINE | Facility: CLINIC | Age: 69
End: 2024-10-23
Payer: MEDICARE

## 2024-10-23 VITALS
OXYGEN SATURATION: 99 % | TEMPERATURE: 98 F | BODY MASS INDEX: 34.93 KG/M2 | RESPIRATION RATE: 20 BRPM | HEART RATE: 57 BPM | SYSTOLIC BLOOD PRESSURE: 133 MMHG | DIASTOLIC BLOOD PRESSURE: 71 MMHG | WEIGHT: 223 LBS

## 2024-10-23 DIAGNOSIS — Z87.891 PERSONAL HISTORY OF NICOTINE DEPENDENCE: ICD-10-CM

## 2024-10-23 DIAGNOSIS — E11.9 TYPE 2 DIABETES MELLITUS WITHOUT COMPLICATION, WITHOUT LONG-TERM CURRENT USE OF INSULIN: Primary | ICD-10-CM

## 2024-10-23 DIAGNOSIS — M25.512 ACUTE PAIN OF LEFT SHOULDER: ICD-10-CM

## 2024-10-23 DIAGNOSIS — Z72.0 TOBACCO USE: ICD-10-CM

## 2024-10-23 DIAGNOSIS — E11.9 TYPE 2 DIABETES MELLITUS WITHOUT COMPLICATION, WITHOUT LONG-TERM CURRENT USE OF INSULIN: ICD-10-CM

## 2024-10-23 LAB — HBA1C MFR BLD: 7.2 %

## 2024-10-23 PROCEDURE — 92228 IMG RTA DETC/MNTR DS PHY/QHP: CPT | Mod: TC,PBBFAC | Performed by: INTERNAL MEDICINE

## 2024-10-23 PROCEDURE — 73030 X-RAY EXAM OF SHOULDER: CPT | Mod: TC,LT

## 2024-10-23 PROCEDURE — 99214 OFFICE O/P EST MOD 30 MIN: CPT | Mod: PBBFAC,25

## 2024-10-23 PROCEDURE — 72050 X-RAY EXAM NECK SPINE 4/5VWS: CPT | Mod: TC

## 2024-10-23 PROCEDURE — 83036 HEMOGLOBIN GLYCOSYLATED A1C: CPT | Mod: PBBFAC

## 2024-10-23 NOTE — PROGRESS NOTES
[facility] INTERNAL MEDICINE  OUTPATIENT OFFICE VISIT NOTE    SUBJECTIVE:      Chief Complaint: Follow-up (5 weeks ago left shoulder pain began while he was asleep- he thinks it was a position change that affected his shoulder- numbness and tingling sensation at times radiating down his arm.  LDCT. Medication Refill- Januvia)       HPI: Hayden Aquino is a 69 y.o. yo male w/ PMH of  has a past medical history of Disorder of kidney and ureter (December 2022), GERD (gastroesophageal reflux disease), Headache (July 2015), and Hypertension (July 2015)., who presents for establishment with myself   Complaining of tingling and burning left shoulder pain. It started 5 weeks ago. Has continued. No inciting event. Will evaluate with imaging.         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     Past Surgical History:   has a past surgical history that includes Spine surgery (July 2012).     Family History:  family history includes Cancer in his father; Hypertension in his mother.     Social History:   reports that he quit smoking about 2 years ago. His smoking use included cigarettes. He started smoking about 50 years ago. He has a 24 pack-year smoking history. He has never used smokeless tobacco. He reports that he does not currently use alcohol after a past usage of about 23.0 standard drinks of alcohol per week. He reports that he does not use drugs.     Allergies:  has No Known Allergies.     Home Medications:  Current Outpatient Medications   Medication Instructions    amLODIPine (NORVASC) 10 mg, Oral, Daily    blood sugar diagnostic Strp 1 each, Misc.(Non-Drug; Combo Route), 2 times daily    blood-glucose meter (FREESTYLE SYSTEM KIT) kit Use as instructed    ferrous sulfate 325 mg, Oral, Every other day    JANUMET 50-1,000 mg per tablet 1 tablet, 2 times daily with meals     lancets (ONETOUCH ULTRASOFT LANCETS) Misc 1 Box , Misc.(Non-Drug; Combo Route), 2 times daily    lisinopriL-hydrochlorothiazide (PRINZIDE,ZESTORETIC) 10-12.5 mg per tablet 1 tablet, Oral, Daily    pantoprazole (PROTONIX) 40 mg, Oral, Daily    rosuvastatin (CRESTOR) 20 mg, Daily    SITagliptin phosphate (JANUVIA) 100 mg, Oral, Daily        ROS:  Negative for all symptoms other than those listed in HPI         OBJECTIVE:     Vital signs:   /71 (BP Location: Left arm, Patient Position: Sitting)   Pulse (!) 57   Temp 98.2 °F (36.8 °C) (Oral)   Resp 20   Wt 101.2 kg (223 lb)   SpO2 99%   BMI 34.93 kg/m²      Physical Examination:  Physical Exam  Cardiovascular:      Rate and Rhythm: Normal rate and regular rhythm.      Heart sounds: No murmur heard.     No friction rub. No gallop.   Pulmonary:      Effort: No respiratory distress.      Breath sounds: No stridor. No wheezing or rhonchi.   Musculoskeletal:         General: No swelling, tenderness, deformity or signs of injury.     Foot: pulses palpable b/l. No lesions or deformity.       Labs:  BMP:   Lab Results   Component Value Date    CO2 26 07/19/2024    BUN 16.8 07/19/2024    CREATININE 1.44 (H) 07/19/2024    GLUCOSE 390 (H) 07/19/2024    CALCIUM 10.1 (H) 07/19/2024     CBC:   Lab Results   Component Value Date    WBC 5.13 07/19/2024    HGB 12.9 (L) 07/19/2024    HCT 38.9 (L) 07/19/2024    MCV 86.3 07/19/2024    RDW 12.4 07/19/2024     LFTs:   Lab Results   Component Value Date    LABPROT 7.9 (H) 07/19/2024    ALBUMIN 4.2 07/19/2024    AST 18 07/19/2024    ALT 18 07/19/2024    ALKPHOS 86 07/19/2024     FLP:   Lab Results   Component Value Date    CHOL 133 07/05/2023    HDL 42 07/05/2023    LDL 59.00 07/05/2023    TRIG 162 (H) 07/05/2023    TOTALCHOLEST 3 07/05/2023     DM:   Lab Results   Component Value Date    HGBA1C 10.3 (H) 09/12/2024    EAG  07/19/2024      Comment:      UNABLE TO CALC      CREATININE 1.44 (H) 07/19/2024     Thyroid:   Lab Results  "  Component Value Date    TSH 1.171 05/13/2023     Anemia: No results found for: "IRON", "TIBC", "FERRITIN", "[id number]", "FOLATE"            ASSESSMENT & PLAN:      Shoulder pain  -burning and tingling.  -likely neurological will evaluate with xrays    Hypertension  -should continue taking Norvasc 10 mg daily, lisinopril-hydrochlorothiazide 10-12.5 mg daily  -blood pressure at goal today  -should continue to check blood pressures at home     Hyperlipidemia  -currently taking Crestor 10 mg daily  -last lipid panel at goal     LATOSHA  -recently saw neurology for LATOSHA, has been set up with new BiPAP and is doing well.      Normocytic anemia   -patient had recent colonoscopy and EGD, colonoscopy mostly normal but EGD showed multiple ulcers  -should remain on Protonix and Pepcid for now   -will repeat CBC to further evaluate hemoglobin and hematocrit     Chronic back pain   -continue taking Voltaren 75 mg as needed for when pain is more severe   -much improved from previous, continue current regimen     Skin tag, left posterior thigh  -will refer to minor procedure clinic for removal        Health Maintenance  Vaccinations  Immunization History   Administered Date(s) Administered    COVID-19, MRNA, LN-S, PF (MODERNA FULL 0.5 ML DOSE) 03/28/2021, 04/24/2021    Dtap, Unspecified Formulation 08/28/2013    Influenza 10/16/2003, 10/20/2003, 11/02/2004, 11/15/2007, 10/21/2009, 09/30/2011, 10/03/2013, 11/01/2016, 10/27/2017, 01/01/2023    PPD Test 05/08/2007    Pneumococcal 11/03/2003, 03/05/2009    Pneumococcal Conjugate - 20 Valent 04/11/2022, 08/17/2023    Td (Adult), Unspecified Formulation 01/01/2000, 08/09/2006    Tdap 08/17/2023              Return to clinic in 6 month(s).    Baljinder Lopez M.D  [facility] PGY-2                  Answers submitted by the patient for this visit:  Review of Systems Questionnaire (Submitted on 10/21/2024)  activity change: No  unexpected weight change: No  neck pain: No  hearing loss: " No  rhinorrhea: No  trouble swallowing: No  eye discharge: No  visual disturbance: No  chest tightness: No  wheezing: No  chest pain: No  palpitations: Yes  blood in stool: No  constipation: No  vomiting: No  diarrhea: No  polydipsia: No  polyuria: No  difficulty urinating: No  urgency: No  hematuria: No  joint swelling: No  arthralgias: No  headaches: No  weakness: Yes  confusion: Yes  dysphoric mood: No

## 2024-11-08 RX ORDER — LISINOPRIL AND HYDROCHLOROTHIAZIDE 10; 12.5 MG/1; MG/1
1 TABLET ORAL DAILY
Qty: 90 TABLET | Refills: 3 | Status: SHIPPED | OUTPATIENT
Start: 2024-11-08 | End: 2025-11-08

## 2024-11-20 ENCOUNTER — HOSPITAL ENCOUNTER (OUTPATIENT)
Dept: RADIOLOGY | Facility: HOSPITAL | Age: 69
Discharge: HOME | End: 2024-11-20
Payer: MEDICARE

## 2024-11-20 DIAGNOSIS — Z87.891 PERSONAL HISTORY OF NICOTINE DEPENDENCE: ICD-10-CM

## 2024-11-20 PROCEDURE — 71271 CT THORAX LUNG CANCER SCR C-: CPT | Mod: TC

## 2025-01-07 RX ORDER — FERROUS SULFATE 325(65) MG
325 TABLET, DELAYED RELEASE (ENTERIC COATED) ORAL EVERY OTHER DAY
Qty: 30 TABLET | Refills: 2 | Status: SHIPPED | OUTPATIENT
Start: 2025-01-07

## 2025-01-24 ENCOUNTER — TELEPHONE (OUTPATIENT)
Dept: INTERNAL MEDICINE | Facility: CLINIC | Age: 70
End: 2025-01-24
Payer: MEDICARE

## 2025-01-24 NOTE — TELEPHONE ENCOUNTER
Contacted patient ID and  verified. Patient informed of Lung CA screening results. Patient verbalized complete understanding.

## 2025-01-24 NOTE — TELEPHONE ENCOUNTER
----- Message from Baljinder Lopez sent at 1/20/2025  8:09 PM CST -----  Results reviewed. Will continue annual monitoring. Will discuss at next visit

## 2025-04-03 DIAGNOSIS — I10 HYPERTENSION, UNSPECIFIED TYPE: ICD-10-CM

## 2025-04-07 ENCOUNTER — OFFICE VISIT (OUTPATIENT)
Dept: INTERNAL MEDICINE | Facility: CLINIC | Age: 70
End: 2025-04-07
Payer: MEDICARE

## 2025-04-07 ENCOUNTER — LAB VISIT (OUTPATIENT)
Dept: LAB | Facility: HOSPITAL | Age: 70
End: 2025-04-07
Payer: MEDICARE

## 2025-04-07 VITALS
HEART RATE: 62 BPM | SYSTOLIC BLOOD PRESSURE: 128 MMHG | RESPIRATION RATE: 20 BRPM | HEIGHT: 67 IN | TEMPERATURE: 98 F | DIASTOLIC BLOOD PRESSURE: 69 MMHG | WEIGHT: 232.63 LBS | OXYGEN SATURATION: 99 % | BODY MASS INDEX: 36.51 KG/M2

## 2025-04-07 DIAGNOSIS — Z12.11 COLON CANCER SCREENING: ICD-10-CM

## 2025-04-07 DIAGNOSIS — Z23 NEED FOR VACCINATION: Primary | ICD-10-CM

## 2025-04-07 DIAGNOSIS — I10 PRIMARY HYPERTENSION: ICD-10-CM

## 2025-04-07 DIAGNOSIS — E11.9 TYPE 2 DIABETES MELLITUS WITHOUT COMPLICATION, WITHOUT LONG-TERM CURRENT USE OF INSULIN: ICD-10-CM

## 2025-04-07 DIAGNOSIS — E78.5 HYPERLIPIDEMIA, UNSPECIFIED HYPERLIPIDEMIA TYPE: ICD-10-CM

## 2025-04-07 LAB
CHOLEST SERPL-MCNC: 111 MG/DL
CHOLEST/HDLC SERPL: 3 {RATIO} (ref 0–5)
HBA1C MFR BLD: 7.1 %
HDLC SERPL-MCNC: 38 MG/DL (ref 35–60)
LDLC SERPL CALC-MCNC: 53 MG/DL (ref 50–140)
TRIGL SERPL-MCNC: 100 MG/DL (ref 34–140)
VLDLC SERPL CALC-MCNC: 20 MG/DL

## 2025-04-07 PROCEDURE — 83036 HEMOGLOBIN GLYCOSYLATED A1C: CPT | Mod: PBBFAC

## 2025-04-07 PROCEDURE — 90750 HZV VACC RECOMBINANT IM: CPT | Mod: PBBFAC

## 2025-04-07 PROCEDURE — 90471 IMMUNIZATION ADMIN: CPT | Mod: PBBFAC

## 2025-04-07 PROCEDURE — 80061 LIPID PANEL: CPT

## 2025-04-07 PROCEDURE — 36415 COLL VENOUS BLD VENIPUNCTURE: CPT

## 2025-04-07 PROCEDURE — 99214 OFFICE O/P EST MOD 30 MIN: CPT | Mod: PBBFAC

## 2025-04-07 RX ADMIN — Medication 0.5 ML: at 10:04

## 2025-04-07 NOTE — PROGRESS NOTES
Attending physician addendum-  Patient discussed in clinic with the resident.   Care provided is appropriate.   Patient seen face to face in the clinic. Await diabetes testing results to adjust medications.

## 2025-04-07 NOTE — PROGRESS NOTES
[facility] INTERNAL MEDICINE  OUTPATIENT OFFICE VISIT NOTE    SUBJECTIVE:      Chief Complaint: 6 MONTH F/U        HPI: Hayden Aquino is a 69 y.o. 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 Doing well overall. No complaints. Agreeable to shingles. Reports he needs a colonoscopy. No records on file. Apparently he had 7 polyps on the last one. Will await records. He reports he no longer wishes to take his protonix. I discussed its utility given he has a history of multiple gastric ulcers.       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     Past Surgical History:   has a past surgical history that includes Spine surgery (July 2012).     Family History:  family history includes Cancer in his father; Hypertension in his mother.     Social History:   reports that he quit smoking about 2 years ago. His smoking use included cigarettes. He started smoking about 50 years ago. He has a 24 pack-year smoking history. He has never used smokeless tobacco. He reports that he does not currently use alcohol after a past usage of about 23.0 standard drinks of alcohol per week. He reports that he does not use drugs.     Allergies:  has no known allergies.     Home Medications:  Current Outpatient Medications   Medication Instructions    amLODIPine (NORVASC) 10 mg, Oral, Daily    blood sugar diagnostic Strp 1 each, Misc.(Non-Drug; Combo Route), 2 times daily    blood-glucose meter (FREESTYLE SYSTEM KIT) kit Use as instructed    ferrous sulfate 325 mg, Oral, Every other day    JANUMET 50-1,000 mg per tablet 1 tablet, 2 times daily with meals    lancets (ONETOUCH ULTRASOFT LANCETS) Misc 1 Box , Misc.(Non-Drug; Combo  "Route), 2 times daily    lisinopriL-hydrochlorothiazide (PRINZIDE,ZESTORETIC) 10-12.5 mg per tablet 1 tablet, Oral, Daily    pantoprazole (PROTONIX) 40 mg, Oral, Daily    rosuvastatin (CRESTOR) 20 mg, Daily        ROS:  Negative for all symptoms other than those listed in HPI         OBJECTIVE:     Vital signs:   /69 (BP Location: Right arm, Patient Position: Sitting)   Pulse 62   Temp 98.4 °F (36.9 °C) (Oral)   Resp 20   Ht 5' 7" (1.702 m)   Wt 105.5 kg (232 lb 9.6 oz)   SpO2 99%   BMI 36.43 kg/m²      Physical Examination:  Physical Exam  Constitutional:       General: He is not in acute distress.     Appearance: He is not ill-appearing.   Cardiovascular:      Rate and Rhythm: Normal rate and regular rhythm.      Heart sounds: No murmur heard.     No friction rub. No gallop.   Pulmonary:      Effort: No respiratory distress.      Breath sounds: No stridor. No wheezing or rhonchi.   Abdominal:      General: Abdomen is flat.      Palpations: Abdomen is soft.   Musculoskeletal:         General: No swelling, tenderness, deformity or signs of injury.   Skin:     Coloration: Skin is not jaundiced or pale.      Findings: No bruising or erythema.   Foot: pulses palpable b/l. No lesions or deformity.       Labs:  BMP:   Lab Results   Component Value Date    CO2 26 07/19/2024    BUN 16.8 07/19/2024    CREATININE 1.44 (H) 07/19/2024    GLUCOSE 390 (H) 07/19/2024    CALCIUM 10.1 (H) 07/19/2024     CBC:   Lab Results   Component Value Date    WBC 5.13 07/19/2024    HGB 12.9 (L) 07/19/2024    HCT 38.9 (L) 07/19/2024    MCV 86.3 07/19/2024    RDW 12.4 07/19/2024     LFTs:   Lab Results   Component Value Date    LABPROT 7.9 (H) 07/19/2024    ALBUMIN 4.2 07/19/2024    AST 18 07/19/2024    ALT 18 07/19/2024    ALKPHOS 86 07/19/2024     FLP:   Lab Results   Component Value Date    CHOL 133 07/05/2023    HDL 42 07/05/2023    LDL 59.00 07/05/2023    TRIG 162 (H) 07/05/2023    TOTALCHOLEST 3 07/05/2023     DM:   Lab " "Results   Component Value Date    HGBA1C 10.3 (H) 09/12/2024    EAG  07/19/2024      Comment:      UNABLE TO CALC      CREATININE 1.44 (H) 07/19/2024    CREATRANDUR 61.8 (L) 10/23/2024     Thyroid:   Lab Results   Component Value Date    TSH 1.171 05/13/2023     Anemia: No results found for: "IRON", "TIBC", "FERRITIN", "BVIPXOXC64", "FOLATE"            ASSESSMENT & PLAN:        DM  -A1C: 7.1  - on janumet   -discussed lifestyle modification.      Hypertension  -should continue taking Norvasc 10 mg daily, lisinopril-hydrochlorothiazide 10-12.5 mg daily  -blood pressure at goal today  -should continue to check blood pressures at home     Hyperlipidemia  -currently taking Crestor 20 mg daily  -last lipid panel not at goal.      LATOSHA  -recently saw neurology for LATOSHA, has been set up with new BiPAP and is doing well.      Normocytic anemia   -patient had recent colonoscopy and EGD, colonoscopy mostly normal but EGD showed multiple ulcers  -pt does not wish to stay on protonix. I educated patient on its importance.   -will repeat CBC to further evaluate hemoglobin and hematocrit     Chronic back pain   -continue taking Voltaren 75 mg as needed for when pain is more severe   -much improved from previous, continue current regimen     Skin tag, left posterior thigh  -will refer to minor procedure clinic for removal      Health Maintenance  Vaccinations  Immunization History   Administered Date(s) Administered    COVID-19, MRNA, LN-S, PF (MODERNA FULL 0.5 ML DOSE) 03/28/2021, 04/24/2021    Dtap, Unspecified Formulation 08/28/2013    Influenza 10/16/2003, 10/20/2003, 11/02/2004, 11/15/2007, 10/21/2009, 09/30/2011, 10/03/2013, 11/01/2016, 10/27/2017, 01/01/2023    PPD Test 05/08/2007    Pneumococcal 11/03/2003, 03/05/2009    Pneumococcal Conjugate - 20 Valent 04/11/2022, 08/17/2023    Td (Adult), Unspecified Formulation 01/01/2000, 08/09/2006    Tdap 08/17/2023              Return to clinic in 6 month(s).    Baljinder Lopez M.D  [employer] " Internal Medicine PGY-2      Orders Placed This Encounter    Ambulatory referral/consult to Gastroenterology    POCT HEMOGLOBIN A1C              Answers submitted by the patient for this visit:  Review of Systems Questionnaire (Submitted on 10/21/2024)  activity change: No  unexpected weight change: No  neck pain: No  hearing loss: No  rhinorrhea: No  trouble swallowing: No  eye discharge: No  visual disturbance: No  chest tightness: No  wheezing: No  chest pain: No  palpitations: Yes  blood in stool: No  constipation: No  vomiting: No  diarrhea: No  polydipsia: No  polyuria: No  difficulty urinating: No  urgency: No  hematuria: No  joint swelling: No  arthralgias: No  headaches: No  weakness: Yes  confusion: Yes  dysphoric mood: No

## 2025-04-08 RX ORDER — AMLODIPINE BESYLATE 10 MG/1
10 TABLET ORAL DAILY
Qty: 90 TABLET | Refills: 3 | Status: SHIPPED | OUTPATIENT
Start: 2025-04-08 | End: 2026-04-08

## 2025-04-08 RX ORDER — PANTOPRAZOLE SODIUM 40 MG/1
40 TABLET, DELAYED RELEASE ORAL DAILY
Qty: 90 TABLET | Refills: 3 | Status: SHIPPED | OUTPATIENT
Start: 2025-04-08 | End: 2026-04-08

## 2025-05-19 RX ORDER — FERROUS SULFATE 325(65) MG
325 TABLET, DELAYED RELEASE (ENTERIC COATED) ORAL EVERY OTHER DAY
Qty: 30 TABLET | Refills: 2 | Status: SHIPPED | OUTPATIENT
Start: 2025-05-19

## 2025-07-01 ENCOUNTER — HOSPITAL ENCOUNTER (EMERGENCY)
Facility: HOSPITAL | Age: 70
Discharge: HOME | End: 2025-07-01
Attending: FAMILY MEDICINE
Payer: MEDICARE

## 2025-07-01 VITALS
HEIGHT: 67 IN | DIASTOLIC BLOOD PRESSURE: 67 MMHG | BODY MASS INDEX: 36.41 KG/M2 | HEART RATE: 69 BPM | TEMPERATURE: 98 F | RESPIRATION RATE: 20 BRPM | WEIGHT: 232 LBS | SYSTOLIC BLOOD PRESSURE: 132 MMHG | OXYGEN SATURATION: 99 %

## 2025-07-01 DIAGNOSIS — S05.01XA ABRASION OF RIGHT CORNEA, INITIAL ENCOUNTER: Primary | ICD-10-CM

## 2025-07-01 PROCEDURE — 99283 EMERGENCY DEPT VISIT LOW MDM: CPT

## 2025-07-01 RX ORDER — GENTAMICIN SULFATE 1 MG/G
OINTMENT TOPICAL 3 TIMES DAILY
Qty: 15 G | Refills: 0 | Status: SHIPPED | OUTPATIENT
Start: 2025-07-01 | End: 2025-07-03

## 2025-07-01 NOTE — ED PROVIDER NOTES
"Encounter Date: 2025       History     Chief Complaint   Patient presents with    Eye Pain     Pt c/o "burning" sensation in right eye. Pt was helping cut a tree down this morning and reports around 1100 he felt the burning, he says he feels it was the sweat getting in his eye that triggered this. Pt has since rinsed his eye with tap water and used an otc eye rinse solution. Pt concerned bc eye solution  in . Pt denies any difficulty with his vision. Vss.      69-year-old gentleman presents to the ER after cutting some branches felt like he got something in his eye that is not there anymore night just has a burning sensation no blurred vision no vision changes on exam has a small abrasion no foreign body noted discussed treatment plan with the patient he understands and agrees with the plan        Review of patient's allergies indicates:  No Known Allergies  Past Medical History:   Diagnosis Date    Disorder of kidney and ureter 2022    GERD (gastroesophageal reflux disease)     Headache 2015    Hypertension 2015     Past Surgical History:   Procedure Laterality Date    SPINE SURGERY  2012    Lower back surgery     Family History   Problem Relation Name Age of Onset    Hypertension Mother      Cancer Father Hayden Aquino          1983     Social History[1]  Review of Systems   All other systems reviewed and are negative.      Physical Exam     Initial Vitals [25 1703]   BP Pulse Resp Temp SpO2   132/67 69 20 98.2 °F (36.8 °C) 99 %      MAP       --         Physical Exam    Nursing note and vitals reviewed.  Constitutional: He appears well-developed and well-nourished. He is active.   HENT:   Head: Normocephalic and atraumatic.   Eyes: Conjunctivae, EOM and lids are normal. Pupils are equal, round, and reactive to light.   Small abrasion no foreign body noted   Neck: Trachea normal and phonation normal. Neck supple. No thyroid mass present.   Normal " range of motion.  Cardiovascular:  Normal rate, regular rhythm, normal heart sounds and normal pulses.           Pulmonary/Chest: Breath sounds normal.   Abdominal: Abdomen is soft. Bowel sounds are normal.   Musculoskeletal:         General: Normal range of motion.      Cervical back: Normal range of motion and neck supple.     Neurological: He is alert and oriented to person, place, and time. He has normal strength and normal reflexes.   Skin: Skin is warm and intact.   Psychiatric: He has a normal mood and affect. His speech is normal and behavior is normal. Judgment and thought content normal. Cognition and memory are normal.         ED Course   Procedures  Labs Reviewed - No data to display       Imaging Results    None          Medications - No data to display  Medical Decision Making  69-year-old gentleman presents to the ER after cutting some branches felt like he got something in his eye that is not there anymore night just has a burning sensation no blurred vision no vision changes on exam has a small abrasion no foreign body noted discussed treatment plan with the patient he understands and agrees with the plan          Risk  Prescription drug management.  Risk Details: Corneal abrasion foreign body allergic conjunctivitis bacterial conjunctivitis                                      Clinical Impression:  Final diagnoses:  [S05.01XA] Abrasion of right cornea, initial encounter (Primary)          ED Disposition Condition    Discharge Stable          ED Prescriptions       Medication Sig Dispense Start Date End Date Auth. Provider    gentamicin (GARAMYCIN) 0.1 % ointment Apply topically 3 (three) times daily. for 2 days 15 g 7/1/2025 7/3/2025 John Mabry MD          Follow-up Information       Follow up With Specialties Details Why Contact Info    Baljinder Lopez MD Internal Medicine  As needed [address] [facility] [id number] [phone]                     [1]   Social History  Tobacco Use     Smoking status: Former     Current packs/day: 0.00     Average packs/day: 0.5 packs/day for 48.0 years (24.0 ttl pk-yrs)     Types: Cigarettes     Start date: 5/5/1974     Quit date: 5/14/2022     Years since quitting: 3.1    Smokeless tobacco: Never    Tobacco comments:     Quit twice, approx. 4 to 5 yrs. Each   Substance Use Topics    Alcohol use: Not Currently     Alcohol/week: 23.0 standard drinks of alcohol     Types: 3 Glasses of wine, 10 Cans of beer, 4 Shots of liquor, 6 Drinks containing 0.5 oz of alcohol per week     Comment: stopped 2 YEARS ago    Drug use: Never        John Mabry MD  07/01/25 [id number]

## 2025-07-10 ENCOUNTER — PATIENT MESSAGE (OUTPATIENT)
Dept: NEUROLOGY | Facility: CLINIC | Age: 70
End: 2025-07-10
Payer: MEDICARE

## 2025-07-24 ENCOUNTER — OFFICE VISIT (OUTPATIENT)
Facility: CLINIC | Age: 70
End: 2025-07-24
Payer: MEDICARE

## 2025-07-24 VITALS — HEART RATE: 65 BPM | SYSTOLIC BLOOD PRESSURE: 128 MMHG | OXYGEN SATURATION: 100 % | DIASTOLIC BLOOD PRESSURE: 74 MMHG

## 2025-07-24 DIAGNOSIS — G47.33 OSA (OBSTRUCTIVE SLEEP APNEA): Primary | ICD-10-CM

## 2025-07-24 PROCEDURE — 3051F HG A1C>EQUAL 7.0%<8.0%: CPT | Mod: CPTII,S$GLB,,

## 2025-07-24 PROCEDURE — 3074F SYST BP LT 130 MM HG: CPT | Mod: CPTII,S$GLB,,

## 2025-07-24 PROCEDURE — 3078F DIAST BP <80 MM HG: CPT | Mod: CPTII,S$GLB,,

## 2025-07-24 PROCEDURE — 4010F ACE/ARB THERAPY RXD/TAKEN: CPT | Mod: CPTII,S$GLB,,

## 2025-07-24 PROCEDURE — 99213 OFFICE O/P EST LOW 20 MIN: CPT | Mod: S$GLB,,,

## 2025-07-24 PROCEDURE — 3288F FALL RISK ASSESSMENT DOCD: CPT | Mod: CPTII,S$GLB,,

## 2025-07-24 PROCEDURE — 1159F MED LIST DOCD IN RCRD: CPT | Mod: CPTII,S$GLB,,

## 2025-07-24 PROCEDURE — 99999 PR PBB SHADOW E&M-EST. PATIENT-LVL III: CPT | Mod: PBBFAC,,,

## 2025-07-24 PROCEDURE — 1160F RVW MEDS BY RX/DR IN RCRD: CPT | Mod: CPTII,S$GLB,,

## 2025-07-24 PROCEDURE — 1101F PT FALLS ASSESS-DOCD LE1/YR: CPT | Mod: CPTII,S$GLB,,

## 2025-07-24 NOTE — PROGRESS NOTES
Neurology    Established LATOSHA Patient   SUBJECTIVE:    Patient ID: Hayden Aquino is a 69 y.o. male.    Chief Complaint: LATOSHA f/u    History of Present Illness:     Here for one year LATOSHA follow up.     Wears CPAP qhs and tolerating it well. Pap therapy beneficial for sleep. Sleeps 5-7.5 hrs per night. No difficulty falling or staying asleep. Awakens refreshed and denies EDS.  ART[facility]- [facility].           7/10/2025     9:35 AM   EPWORTH SLEEPINESS SCALE   Sitting and reading 1   Watching TV 0   Sitting, inactive in a public place (e.g. a theatre or a meeting) 0   As a passenger in a car for an hour without a break 0   Lying down to rest in the afternoon when circumstances permit 1   Sitting and talking to someone 0   Sitting quietly after a lunch without alcohol 0   In a car, while stopped for a few minutes in traffic 0   Total score 2        Patient-reported       Review of Systems -  as per HPI, otherwise pertinent systems review is negative           Past Medical History:   Diagnosis Date    Disorder of kidney and ureter 2022    GERD (gastroesophageal reflux disease)     Headache 2015    Hypertension 2015       Past Surgical History:   Procedure Laterality Date    SPINE SURGERY  2012    Lower back surgery       Family History   Problem Relation Name Age of Onset    Hypertension Mother      Cancer Father Hayden Aquino          1983       Social History     Socioeconomic History    Marital status: Single   Occupational History    Occupation: retired    Tobacco Use    Smoking status: Former     Current packs/day: 0.00     Average packs/day: 0.5 packs/day for 48.0 years (24.0 ttl pk-yrs)     Types: Cigarettes     Start date: 1974     Quit date: 2022     Years since quitting: 3.1    Smokeless tobacco: Never    Tobacco comments:     Quit twice, approx. 4 to 5 yrs. Each   Substance and Sexual Activity    Alcohol use: Not Currently     Alcohol/week: 23.0  standard drinks of alcohol     Types: 3 Glasses of wine, 10 Cans of beer, 4 Shots of liquor, 6 Drinks containing 0.5 oz of alcohol per week     Comment: stopped 2 YEARS ago    Drug use: Never    Sexual activity: Not Currently     Partners: Female     Social Drivers of Health     Financial Resource Strain: Low Risk  (7/20/2025)    Overall Financial Resource Strain (CARDIA)     Difficulty of Paying Living Expenses: Not very hard   Food Insecurity: No Food Insecurity (7/20/2025)    Hunger Vital Sign     Worried About Running Out of Food in the Last Year: Never true     Ran Out of Food in the Last Year: Never true   Transportation Needs: No Transportation Needs (7/20/2025)    PRAPARE - Transportation     Lack of Transportation (Medical): No     Lack of Transportation (Non-Medical): No   Physical Activity: Sufficiently Active (7/20/2025)    Exercise Vital Sign     Days of Exercise per Week: 4 days     Minutes of Exercise per Session: 90 min   Stress: No Stress Concern Present (7/20/2025)    [employer] [place] [employer] - Occupational Stress Questionnaire     Feeling of Stress : Not at all   Housing Stability: Low Risk  (7/20/2025)    Housing Stability Vital Sign     Unable to Pay for Housing in the Last Year: No     Number of Times Moved in the Last Year: 0     Homeless in the Last Year: No   Recent Concern: Housing Stability - High Risk (6/9/2025)    Received from [facility] SDOH Screening     What is your living situation today?: I do not have a steady place to live (i am temporarily staying with others, in a hotel, in a [place]...       Review of patient's allergies indicates:  No Known Allergies    Current Outpatient Medications   Medication Instructions    amLODIPine (NORVASC) 10 mg, Oral, Daily    blood sugar diagnostic Strp 1 each, Misc.(Non-Drug; Combo Route), 2 times daily    blood-glucose meter (FREESTYLE SYSTEM KIT) kit Use as instructed    ferrous sulfate 325 mg, Oral, Every other day     JANUMET 50-1,000 mg per tablet 1 tablet, 2 times daily with meals    lancets (ONETOUCH ULTRASOFT LANCETS) Misc 1 Box , Misc.(Non-Drug; Combo Route), 2 times daily    lisinopriL-hydrochlorothiazide (PRINZIDE,ZESTORETIC) 10-12.5 mg per tablet 1 tablet, Oral, Daily    pantoprazole (PROTONIX) 40 mg, Oral, Daily    rosuvastatin (CRESTOR) 20 mg, Daily       OBJECTIVE:    Vitals:  Visit Vitals  /74 (BP Location: Right arm, Patient Position: Sitting)   Pulse 65   SpO2 100%       Physical Exam:  Constitutional  he appears well-developed and well-nourished. he is well groomed.    Accompanied by - alone  Appearance - well appearing, no apparent distress, unassisted  Heart - RRR auscultated without murmur  Lungs - CTA, pulmonary effort normal  Skin- no obvious lesions noted    Neurologic  Cortical function - The patient is alert, attentive   Speech - clear   Cranial nerves:  CN 3, 4, 6 EOMs - normal. No ptosis, nystagmus, or lateral gaze deviation  CN 7 - no face asymmetry   CN 8 - hearing is grossly normal  Gait - unassisted; posture upright. Gait is steady with normal steps    Review of Data:      PAP Compliance Report  6/19/2025- 7/18/2025  Usage > 4 hrs - 97%  AHI - 1.4  Bipap 17/11    ASSESSMENT/PLAN:    1. LATOSHA (obstructive sleep apnea)  -     CPAP/BIPAP SUPPLIES      - Continues to benefit from PAP therapy.   - Encouraged nightly use of PAP.   - order generated for refill on supplies.   - Drowsy driving may still occur despite PAP use.        Follow-up one year. In addition to their scheduled follow up, the patient has also been instructed to follow up on as needed basis.     Questions and concerns were sought and answered to the patient's stated verbal satisfaction.    The patient verbalizes understanding and agreement with the above stated treatment plan.   Items discussed include acute and/or chronic neurological, sleep, or other issues and their attendant differential diagnoses.  Potential for additional  testing, treatment options, and prognosis also discussed.  Dr. Jay Cardenas available during encounter.    Yoly Romeo, MSN, APRN, FNP-C  [facility]  [phone]